# Patient Record
Sex: FEMALE | Race: BLACK OR AFRICAN AMERICAN | NOT HISPANIC OR LATINO | URBAN - METROPOLITAN AREA
[De-identification: names, ages, dates, MRNs, and addresses within clinical notes are randomized per-mention and may not be internally consistent; named-entity substitution may affect disease eponyms.]

---

## 2024-05-10 ENCOUNTER — OFFICE VISIT (OUTPATIENT)
Dept: FAMILY MEDICINE CLINIC | Facility: CLINIC | Age: 19
End: 2024-05-10
Payer: OTHER GOVERNMENT

## 2024-05-10 VITALS
OXYGEN SATURATION: 100 % | SYSTOLIC BLOOD PRESSURE: 90 MMHG | BODY MASS INDEX: 20.14 KG/M2 | HEIGHT: 69 IN | DIASTOLIC BLOOD PRESSURE: 68 MMHG | HEART RATE: 102 BPM | WEIGHT: 136 LBS | TEMPERATURE: 97 F
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
## 2024-05-10 DIAGNOSIS — G82.20 PARAPLEGIA: ICD-10-CM

## 2024-05-10 DIAGNOSIS — S06.9X2D TRAUMATIC BRAIN INJURY, WITH LOSS OF CONSCIOUSNESS OF 31 MINUTES TO 59 MINUTES, SUBSEQUENT ENCOUNTER: ICD-10-CM

## 2024-05-10 DIAGNOSIS — Z76.89 ENCOUNTER TO ESTABLISH CARE: Primary | ICD-10-CM

## 2024-05-10 DIAGNOSIS — S22.049K: ICD-10-CM

## 2024-05-10 DIAGNOSIS — S24.102D T5 SPINAL CORD INJURY, SUBSEQUENT ENCOUNTER: ICD-10-CM

## 2024-05-10 RX ORDER — GABAPENTIN 300 MG/1
CAPSULE ORAL
COMMUNITY
Start: 2024-05-02

## 2024-05-10 RX ORDER — LIDOCAINE 50 MG/G
2 PATCH TOPICAL EVERY 24 HOURS
Qty: 90 PATCH | Refills: 3 | Status: SHIPPED | OUTPATIENT
Start: 2024-05-10

## 2024-05-10 RX ORDER — MIDODRINE HYDROCHLORIDE 2.5 MG/1
TABLET ORAL
COMMUNITY
Start: 2024-05-02

## 2024-05-10 RX ORDER — LIDOCAINE 50 MG/G
PATCH TOPICAL
COMMUNITY
Start: 2024-05-08 | End: 2024-05-10 | Stop reason: SDUPTHER

## 2024-05-14 ENCOUNTER — TELEPHONE (OUTPATIENT)
Dept: FAMILY MEDICINE CLINIC | Facility: CLINIC | Age: 19
End: 2024-05-14
Payer: OTHER GOVERNMENT

## 2024-05-14 DIAGNOSIS — K59.00 CONSTIPATION, UNSPECIFIED CONSTIPATION TYPE: Primary | ICD-10-CM

## 2024-05-14 NOTE — TELEPHONE ENCOUNTER
Caller: Ehsan Woodson    Relationship: Self    Best call back number: [phone]    What medication are you requesting: bisacodyl suppository 10 MG    What are your current symptoms: PATIENT USES TO AID IN BOWEL MOVEMENT    How long have you been experiencing symptoms: PATIENT SAID SHE WAS PRESCRIBED PRESCRIPTION FROM  IN [place]    Have you had these symptoms before:    [] Yes  [] No    Have you been treated for these symptoms before:   [] Yes  [] No    If a prescription is needed, what is your preferred pharmacy and phone number: [facility][id number] - [place], [place] - [address] AT [facility] OF [address] & [place] - [phone]  - [phone] FX     Additional notes:PATIENT SAID SHE HAD ONE SUPPOSITORY LEFT AND SHE USES THIS TO AID WITH BOWEL MOVEMENTS

## 2024-05-15 RX ORDER — BISACODYL 10 MG
10 SUPPOSITORY, RECTAL RECTAL DAILY
Qty: 8 SUPPOSITORY | Refills: 0 | Status: SHIPPED | OUTPATIENT
Start: 2024-05-15 | End: 2024-06-14

## 2024-05-30 NOTE — TELEPHONE ENCOUNTER
Caller: Lily Woodsonmaddi    Relationship: Self    Best call back number: [phone]    Requested Prescriptions:   Requested Prescriptions     Pending Prescriptions Disp Refills    gabapentin (NEURONTIN) 300 MG capsule      midodrine (PROAMATINE) 2.5 MG tablet      CeleBREX 100 MG capsule      ALSO NEEDS TYLENOL 300 MG. AND MELATONIN     Pharmacy where request should be sent: [id number] - [place], [place] - [address] AT [facility] OF [facility] [address] & [place] - [phone] [facility] [phone] FX     Last office visit with prescribing clinician: 5/10/2024   Last telemedicine visit with prescribing clinician: Visit date not found   Next office visit with prescribing clinician: 6/10/2024     Additional details provided by patient:     Does the patient have less than a 3 day supply:  [x] Yes  [] No    Would you like a call back once the refill request has been completed: [] Yes [] No    If the office needs to give you a call back, can they leave a voicemail: [] Yes [] No    Jing Nam Rep   05/30/24 13:24 EDT

## 2024-05-31 ENCOUNTER — TREATMENT (OUTPATIENT)
Dept: PHYSICAL THERAPY | Facility: CLINIC | Age: 19
End: 2024-05-31
Payer: OTHER GOVERNMENT

## 2024-05-31 DIAGNOSIS — S06.9X2D TRAUMATIC BRAIN INJURY WITH LOSS OF CONSCIOUSNESS OF 31 MINUTES TO 59 MINUTES, SUBSEQUENT ENCOUNTER: ICD-10-CM

## 2024-05-31 DIAGNOSIS — S24.102D T5 SPINAL CORD INJURY, SUBSEQUENT ENCOUNTER: Primary | ICD-10-CM

## 2024-05-31 DIAGNOSIS — R26.2 DIFFICULTY WALKING: ICD-10-CM

## 2024-05-31 PROCEDURE — 97530 THERAPEUTIC ACTIVITIES: CPT | Performed by: PHYSICAL THERAPIST

## 2024-05-31 PROCEDURE — 97162 PT EVAL MOD COMPLEX 30 MIN: CPT | Performed by: PHYSICAL THERAPIST
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
## 2024-06-04 ENCOUNTER — TELEPHONE (OUTPATIENT)
Dept: FAMILY MEDICINE CLINIC | Facility: CLINIC | Age: 19
End: 2024-06-04
Payer: OTHER GOVERNMENT

## 2024-06-04 DIAGNOSIS — S22.049K: ICD-10-CM

## 2024-06-04 DIAGNOSIS — K59.00 CONSTIPATION, UNSPECIFIED CONSTIPATION TYPE: ICD-10-CM

## 2024-06-04 DIAGNOSIS — F41.9 ANXIETY: Primary | ICD-10-CM

## 2024-06-04 DIAGNOSIS — G47.00 INSOMNIA, UNSPECIFIED TYPE: ICD-10-CM

## 2024-06-04 RX ORDER — UREA 10 %
5 LOTION (ML) TOPICAL NIGHTLY
Qty: 90 EACH | Refills: 0 | Status: SHIPPED | OUTPATIENT
Start: 2024-06-04

## 2024-06-04 RX ORDER — GABAPENTIN 300 MG/1
300 CAPSULE ORAL 3 TIMES DAILY
Qty: 180 CAPSULE | Refills: 0 | Status: SHIPPED | OUTPATIENT
Start: 2024-06-04

## 2024-06-04 RX ORDER — UREA 10 %
5 LOTION (ML) TOPICAL
COMMUNITY
Start: 2024-05-01 | End: 2024-06-04 | Stop reason: SDUPTHER

## 2024-06-04 RX ORDER — MIDODRINE HYDROCHLORIDE 2.5 MG/1
TABLET ORAL
OUTPATIENT
Start: 2024-06-04

## 2024-06-04 RX ORDER — MIDODRINE HYDROCHLORIDE 2.5 MG/1
2.5 TABLET ORAL
Qty: 90 TABLET | Refills: 0 | Status: SHIPPED | OUTPATIENT
Start: 2024-06-04

## 2024-06-04 RX ORDER — GABAPENTIN 300 MG/1
CAPSULE ORAL
OUTPATIENT
Start: 2024-06-04

## 2024-06-04 NOTE — TELEPHONE ENCOUNTER
Caller: [id number] - [place], [place] - [address] AT [facility] OF [address] [facility] & [place] - [phone] [facility] [phone] FX    Relationship to patient: Pharmacy    Best call back number: [phone]    Patient is needing: PHARMACY RECEIVED A PRESCRIPTION FOR CELEBREX AND IT HAD TO BE NAME BRAND. THE INSURANCE WILL ONLY COVER THE GENERIC AND PATIENT CANNOT AFFORD THE NAME BRAND OUT OF POCKET EXPENSE. PLEASE CALL PHARMACY AND CLARIFY IF THIS CAN BE GENERIC.

## 2024-06-04 NOTE — TELEPHONE ENCOUNTER
Can you please find out that the patient is coming in to see me soon also the medications placed 6 are without any sig

## 2024-06-04 NOTE — TELEPHONE ENCOUNTER
Hub staff attempted to follow warm transfer process and was unsuccessful     Caller: Ehsan Woodson    Relationship to patient: Self    Best call back number: [phone]    Patient is needing:          THE PATIENT RETURNED A CALL. SHE SAID SHE RECEIVED A CALL  3 TIMES. SHE THINKS IT MAY BE ABOUT HER MEDICATION

## 2024-06-05 ENCOUNTER — TELEPHONE (OUTPATIENT)
Dept: FAMILY MEDICINE CLINIC | Facility: CLINIC | Age: 19
End: 2024-06-05
Payer: OTHER GOVERNMENT

## 2024-06-10 ENCOUNTER — OFFICE VISIT (OUTPATIENT)
Dept: FAMILY MEDICINE CLINIC | Facility: CLINIC | Age: 19
End: 2024-06-10
Payer: OTHER GOVERNMENT

## 2024-06-10 VITALS
RESPIRATION RATE: 18 BRPM | DIASTOLIC BLOOD PRESSURE: 64 MMHG | OXYGEN SATURATION: 99 % | SYSTOLIC BLOOD PRESSURE: 118 MMHG | WEIGHT: 136 LBS | HEIGHT: 69 IN | TEMPERATURE: 97.9 F | HEART RATE: 81 BPM | BODY MASS INDEX: 20.14 KG/M2

## 2024-06-10 DIAGNOSIS — K59.00 CONSTIPATION, UNSPECIFIED CONSTIPATION TYPE: ICD-10-CM

## 2024-06-10 DIAGNOSIS — M62.89 MUSCLE STIFFNESS: Primary | ICD-10-CM

## 2024-06-10 DIAGNOSIS — S22.049K: ICD-10-CM

## 2024-06-10 DIAGNOSIS — F33.0 MDD (MAJOR DEPRESSIVE DISORDER), RECURRENT EPISODE, MILD: ICD-10-CM

## 2024-06-10 DIAGNOSIS — S24.102D T5 SPINAL CORD INJURY, SUBSEQUENT ENCOUNTER: ICD-10-CM

## 2024-06-10 PROCEDURE — 99214 OFFICE O/P EST MOD 30 MIN: CPT | Performed by: STUDENT IN AN ORGANIZED HEALTH CARE EDUCATION/TRAINING PROGRAM

## 2024-06-10 RX ORDER — BISACODYL 10 MG
10 SUPPOSITORY, RECTAL RECTAL DAILY
Qty: 30 SUPPOSITORY | Refills: 12 | Status: SHIPPED | OUTPATIENT
Start: 2024-06-10 | End: 2024-07-10

## 2024-06-10 RX ORDER — CYCLOBENZAPRINE HCL 5 MG
5 TABLET ORAL NIGHTLY PRN
Qty: 30 TABLET | Refills: 0 | Status: SHIPPED | OUTPATIENT
Start: 2024-06-10
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
## 2024-06-11 ENCOUNTER — TREATMENT (OUTPATIENT)
Dept: PHYSICAL THERAPY | Facility: CLINIC | Age: 19
End: 2024-06-11
Payer: OTHER GOVERNMENT

## 2024-06-11 DIAGNOSIS — S06.9X2D TRAUMATIC BRAIN INJURY WITH LOSS OF CONSCIOUSNESS OF 31 MINUTES TO 59 MINUTES, SUBSEQUENT ENCOUNTER: ICD-10-CM

## 2024-06-11 DIAGNOSIS — R26.2 DIFFICULTY WALKING: ICD-10-CM

## 2024-06-11 DIAGNOSIS — S24.102D T5 SPINAL CORD INJURY, SUBSEQUENT ENCOUNTER: Primary | ICD-10-CM
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
## 2024-06-12 ENCOUNTER — TREATMENT (OUTPATIENT)
Dept: PHYSICAL THERAPY | Facility: CLINIC | Age: 19
End: 2024-06-12
Payer: OTHER GOVERNMENT

## 2024-06-12 DIAGNOSIS — S24.102D T5 SPINAL CORD INJURY, SUBSEQUENT ENCOUNTER: Primary | ICD-10-CM

## 2024-06-12 DIAGNOSIS — S06.9X2D TRAUMATIC BRAIN INJURY WITH LOSS OF CONSCIOUSNESS OF 31 MINUTES TO 59 MINUTES, SUBSEQUENT ENCOUNTER: ICD-10-CM

## 2024-06-12 DIAGNOSIS — R26.2 DIFFICULTY WALKING: ICD-10-CM

## 2024-06-12 NOTE — PROGRESS NOTES
Physical Therapy Daily Treatment Note  [facility] [address] [place] [address]    Patient: Ehsan Woodson   : 2005  Referring practitioner: Gary Cash MD  Date of Initial Visit: Type: THERAPY  Noted: 2024  Today's Date: 2024  Patient seen for 3 sessions           Subjective  Ehsan Woodson reports:  she felt tired after her therapy session yesterday.     Objective   Ehsan brought to therapy by her mother Charmaine who remained in attendance.   STS in //bars with SBA, heavy use of arms.    See Exercise, Manual, and Modality Logs for complete treatment.     Assessment/Plan  Ehsan is so willing to try new things in treatment.  Charmaine, pts mother, took photos of new exercises for them to work on at home. She is to practice visualization with all. Continue per POC.    Visit Diagnoses:    ICD-10-CM ICD-9-CM   1. T5 spinal cord injury, subsequent encounter  S24.102D V58.89     952.10   2. 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 Difficulty walking  R26.2 719.7       Progress per Plan of Care and Progress strengthening /stabilization /functional activity         Timed:  Manual Therapy:         mins  79986;  Therapeutic Exercise:    10     mins  47127;     Neuromuscular Rachana:    20    mins  89857;    Therapeutic Activity:     15     mins  39932;     Gait Training:           mins  88418;     Ultrasound:          mins  88833;    Electrical Stimulation:         mins  00536 ( );  Aquatics  __   mins   57165    Untimed:  Electrical Stimulation:         mins  79584 ( );  Mechanical Traction:         mins  61704;     Timed Treatment:   45   mins   Total Treatment:     45   mins    Electronically Signed:  Pauline Johnson PTA  Physical Therapist Assistant    [place] PTA license [id number]

## 2024-06-17 ENCOUNTER — TELEPHONE (OUTPATIENT)
Dept: FAMILY MEDICINE CLINIC | Facility: CLINIC | Age: 19
End: 2024-06-17
Payer: OTHER GOVERNMENT

## 2024-06-17 NOTE — TELEPHONE ENCOUNTER
Efren , As you know I’m having spasms and at first they weren’t so strong but now they are very strong in my legs and I can’t control it. My PT therapists were telling me about her spasms and how she took a medication to help them. Is there anything you can prescribe to help stop my spams so they won’t be so strong?  Thank you      -radha

## 2024-06-17 NOTE — TELEPHONE ENCOUNTER
----- Message from [facility] [employer] sent at 6/17/2024  1:37 PM EDT -----  Regarding: Spasms  Contact: [phone]  Okay thank you . If there’s anything you can do , send to another doctor or anything please let me know . Thank you again

## 2024-06-17 NOTE — TELEPHONE ENCOUNTER
----- Message from [facility] [employer] sent at 6/17/2024  1:37 PM EDT -----  Regarding: Spasms  Contact: [phone]  Okay thank you . If there’s anything you can do , send to another doctor or anything please let me know . Thank you again

## 2024-06-18 ENCOUNTER — TREATMENT (OUTPATIENT)
Dept: PHYSICAL THERAPY | Facility: CLINIC | Age: 19
End: 2024-06-18
Payer: OTHER GOVERNMENT

## 2024-06-18 DIAGNOSIS — S24.102D T5 SPINAL CORD INJURY, SUBSEQUENT ENCOUNTER: Primary | ICD-10-CM

## 2024-06-18 DIAGNOSIS — S06.9X2D TRAUMATIC BRAIN INJURY WITH LOSS OF CONSCIOUSNESS OF 31 MINUTES TO 59 MINUTES, SUBSEQUENT ENCOUNTER: ICD-10-CM

## 2024-06-18 DIAGNOSIS — R26.2 DIFFICULTY WALKING: ICD-10-CM

## 2024-06-18 NOTE — PROGRESS NOTES
Physical Therapy Daily Treatment Note  [place] [facility] [address] [place] [address]    Patient: Ehsan Woodson   : 2005  Referring practitioner: Gary Cash MD  Date of Initial Visit: Type: THERAPY  Noted: 2024  Today's Date: 2024  Patient seen for 4 sessions           Subjective  Ehsan Woodson reports: she has been working on exercises in bed. Ehsan related she doesn't like to lie on her stomach but has been doing this also.      Objective   Ehsan attended session with her mother Gilbert who remained in attendance. She videoed Ehsan walking in //bars with solo step.    See Exercise, Manual, and Modality Logs for complete treatment.       Assessment/Plan  Gains made in her standing/gait today. Ehsan did better with all her transitions,STS, sit to supine/to long sit. She feels the muscle relaxers are helping reduce her spacticity.     Visit Diagnoses:    ICD-10-CM ICD-9-CM   1. T5 spinal cord injury, subsequent encounter  S24.102D V58.89     952.10   2. 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 Difficulty walking  R26.2 719.7       Progress per Plan of Care and Progress strengthening /stabilization /functional activity           Timed:  Manual Therapy:    8     mins  18714;  Therapeutic Exercise:         mins  92238;     Neuromuscular Rachnaa:    20    mins  99556;    Therapeutic Activity:     15     mins  39106;     Gait Training:           mins  34063;     Ultrasound:          mins  83620;    Electrical Stimulation:         mins  23481 ( );  Aquatics  __   mins   68895    Untimed:  Electrical Stimulation:         mins  33143 ( );  Mechanical Traction:         mins  10934;     Timed Treatment:   43   mins   Total Treatment:     43   mins    Electronically Signed:  Pauline Johnson PTA  Physical Therapist Assistant    [place] PTA license [id number]

## 2024-06-19 ENCOUNTER — TREATMENT (OUTPATIENT)
Dept: PHYSICAL THERAPY | Facility: CLINIC | Age: 19
End: 2024-06-19
Payer: OTHER GOVERNMENT

## 2024-06-19 DIAGNOSIS — S06.9X2D TRAUMATIC BRAIN INJURY WITH LOSS OF CONSCIOUSNESS OF 31 MINUTES TO 59 MINUTES, SUBSEQUENT ENCOUNTER: ICD-10-CM

## 2024-06-19 DIAGNOSIS — S24.102D T5 SPINAL CORD INJURY, SUBSEQUENT ENCOUNTER: Primary | ICD-10-CM

## 2024-06-19 DIAGNOSIS — R26.2 DIFFICULTY WALKING: ICD-10-CM

## 2024-06-19 NOTE — PROGRESS NOTES
Physical Therapy Daily Treatment Note  [place] [facility] [address] [place] [address] [place] [address]    Patient: Ehsan Woodson   : 2005  Referring practitioner: Gary Cash MD  Date of Initial Visit: Type: THERAPY  Noted: 2024  Today's Date: 2024  Patient seen for 5 sessions           Subjective  Ehsan Woodson reports: she went home and ate then rested yesterday after P.T. When asked about the video her mother took of her walking, both Ehsan and her mother Dante both spoke about sending it to family and the positive feedback they received. Ehsan spoke about her spasms being less.    Objective   See Exercise, Manual, and Modality Logs for complete treatment.     Assessment/Plan  Each session is revealing improved active/functional ability. Ehsan and her mother report she was not taking steps while in rehab in [place] and that yesterday was her first time being able to take steps. Retro gait required great effort yet Ehsan demonstrated ability to take 3 steps retro B feet, twice. Ongoing need for therapist directed intervention to further her recovery.    Visit Diagnoses:    ICD-10-CM ICD-9-CM   1. T5 spinal cord injury, subsequent encounter  S24.102D V58.89     952.10   2. 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 Difficulty walking  R26.2 719.7       Progress per Plan of Care and Progress strengthening /stabilization /functional activity           Timed:  Manual Therapy:         mins  44648;  Therapeutic Exercise:         mins  84529;     Neuromuscular Rachana:    15    mins  50421;    Therapeutic Activity:     15     mins  05269;     Gait Training:      15     mins  67178;     Ultrasound:          mins  44965;    Electrical Stimulation:         mins  99036 ( );  Aquatics  __   mins   41685    Untimed:  Electrical Stimulation:         mins  17062 ( );  Mechanical Traction:         mins  72347;      Timed Treatment:   45   mins   Total Treatment:     45   mins    Electronically Signed:  Pauline Johnson PTA  Physical Therapist Assistant    [place] [facility] license [id number]

## 2024-06-20 ENCOUNTER — TREATMENT (OUTPATIENT)
Dept: PHYSICAL THERAPY | Facility: CLINIC | Age: 19
End: 2024-06-20
Payer: OTHER GOVERNMENT

## 2024-06-20 DIAGNOSIS — R26.2 DIFFICULTY WALKING: ICD-10-CM

## 2024-06-20 DIAGNOSIS — S06.9X2D TRAUMATIC BRAIN INJURY WITH LOSS OF CONSCIOUSNESS OF 31 MINUTES TO 59 MINUTES, SUBSEQUENT ENCOUNTER: ICD-10-CM

## 2024-06-20 DIAGNOSIS — S24.102D T5 SPINAL CORD INJURY, SUBSEQUENT ENCOUNTER: Primary | ICD-10-CM

## 2024-06-20 NOTE — PROGRESS NOTES
Outpatient Physical Therapy  [address] [facility], [place] [address]                            Physical Therapy Daily Treatment Note    Patient: Ehsan Woodson   : 2005  Diagnosis/ICD-10 Code:  T5 spinal cord injury, subsequent encounter [S24.102D]  Referring practitioner: Gary Cash MD  Date of Initial Visit: Type: THERAPY  Noted: 2024  Today's Date: 2024  Patient seen for 6 sessions           Subjective   Ehsan Woodson reports: that she doesn't necessarily get sore after PT sessions. States that her right leg feels stronger than her left leg. No complaints of back pain from sitting in wheelchair, she does get up and walk.     Objective   Increased Clonus in LLE with continued exercise.     See Exercise, Manual, and Modality Logs for complete treatment.     Assessment/Plan  Ehsan progressing as evident by decreased falls and increased tolerance of PT session. Pt required  seated rest breaks following ambulating in parallel bars  . Pt would benefit from skilled PT to address strength and endurance deficits, transfer and gait training and any concerns with ADLs.   .    Progress per Plan of Care         Timed:  Manual Therapy:        mins  10577;  Therapeutic Exercise:    15     mins  20498;     Neuromuscular Rachana:    15    mins  17092;    Therapeutic Activity:     15     mins  77668;     Gait Training:           mins  23616;          Timed Treatment:   45   mins   Total Treatment:     45   mins      Electronically signed:   Machelle Kevin PTA  Physical Therapist Assistant  [facility] License #: [id number]

## 2024-06-26 ENCOUNTER — TREATMENT (OUTPATIENT)
Dept: PHYSICAL THERAPY | Facility: CLINIC | Age: 19
End: 2024-06-26
Payer: OTHER GOVERNMENT

## 2024-06-26 DIAGNOSIS — S06.9X2D TRAUMATIC BRAIN INJURY WITH LOSS OF CONSCIOUSNESS OF 31 MINUTES TO 59 MINUTES, SUBSEQUENT ENCOUNTER: ICD-10-CM

## 2024-06-26 DIAGNOSIS — R26.2 DIFFICULTY WALKING: ICD-10-CM

## 2024-06-26 DIAGNOSIS — S24.102D T5 SPINAL CORD INJURY, SUBSEQUENT ENCOUNTER: Primary | ICD-10-CM

## 2024-06-26 NOTE — PROGRESS NOTES
Physical Therapy Daily Treatment Note                      [place] [facility] [address] [place] [address]    Patient: Ehsan Woodson   : 2005  Diagnosis/ICD-10 Code:  T5 spinal cord injury, subsequent encounter [S24.102D]  Referring practitioner: Gary Cash MD  Date of Initial Visit: Type: THERAPY  Noted: 2024  Today's Date: 2024  Patient seen for 7 sessions           Subjective   The patient reported that she was breaking a sweat during the session today. She reported that she liked today's session because she did a lot of challenging things that she has not done before.    Objective   See Exercise, Manual, and Modality Logs for complete treatment.     Assessment/Plan  Pt tolerated today's session well. Pt continues to show gradual improvement. She seems like she is able to tolerate each session a little better than the last, which indicates improvements in global endurance and core/LE strength. Pt was able to walk in the harness on the solo step today with a walker, which she has never done before. She still requires min-mod verbal cueing to straighten the stance leg and activate the stance side glute. During supine assisted HS curls today, pt did have significant increased L quadriceps tone that made activating the L hamstring difficult. PT still wondering if baclofen would be beneficial once pt has an established neurologist in this area. Pt still unable to transfer from the floor to her wheelchair due to core weakness and lack of LE strength/coordination. Skilled therapy still required in order to continue addressing impairments so that pt can have increased independence and continue working towards her goal of unassisted ambulation. Continue POC.       Timed:  Manual Therapy:    0     mins  03785;  Therapeutic Exercise:    0     mins  85193;     Neuromuscular Rachana:  12    mins  07055;    Therapeutic Activity:     23     mins  39224;     Gait Trainin     mins  12884;      Aquatics                         0      mins  91488    Un-timed:  Mechanical Traction      0     mins  31311  Dry Needling     0     mins self-pay  Electrical Stimulation:    0     mins  34196 ( );      Timed Treatment:   55   mins   Total Treatment:     55   mins    Mana Awad PT    Electronically signed 6/26/2024    [place] License: PT - [id number]

## 2024-06-27 ENCOUNTER — TREATMENT (OUTPATIENT)
Dept: PHYSICAL THERAPY | Facility: CLINIC | Age: 19
End: 2024-06-27
Payer: OTHER GOVERNMENT

## 2024-06-27 ENCOUNTER — TELEPHONE (OUTPATIENT)
Dept: FAMILY MEDICINE CLINIC | Facility: CLINIC | Age: 19
End: 2024-06-27

## 2024-06-27 DIAGNOSIS — F33.0 MDD (MAJOR DEPRESSIVE DISORDER), RECURRENT EPISODE, MILD: Primary | ICD-10-CM

## 2024-06-27 DIAGNOSIS — R26.2 DIFFICULTY WALKING: ICD-10-CM

## 2024-06-27 DIAGNOSIS — S24.102D T5 SPINAL CORD INJURY, SUBSEQUENT ENCOUNTER: Primary | ICD-10-CM

## 2024-06-27 DIAGNOSIS — F41.9 ANXIETY: ICD-10-CM

## 2024-06-27 DIAGNOSIS — S06.9X2D TRAUMATIC BRAIN INJURY WITH LOSS OF CONSCIOUSNESS OF 31 MINUTES TO 59 MINUTES, SUBSEQUENT ENCOUNTER: ICD-10-CM

## 2024-06-27 NOTE — PROGRESS NOTES
Physical Therapy Daily Treatment Note  [place] [facility] [address] [place] [address]    Patient: Ehsan Woodson   : 2005  Referring practitioner: Gary Cash MD  Date of Initial Visit: Type: THERAPY  Noted: 2024  Today's Date: 2024  Patient seen for 8 sessions           Subjective  Ehsan Woodson reports:  she has been working out at home. Charmaine, pts mother, presented a video of her walking at home using her RW.    Objective   See Exercise, Manual, and Modality Logs for complete treatment.     Assessment/Plan  Ehsan performs transfers between W/C and equipment with SBA to min A. She seemed excited to be on resistive equipment. Igor returns tomorrow for ongoing therapist directed intervention to promote strength, balance and function.    Visit Diagnoses:    ICD-10-CM ICD-9-CM   1. T5 spinal cord injury, subsequent encounter  S24.102D V58.89     952.10   2. 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 Difficulty walking  R26.2 719.7       Progress per Plan of Care and Progress strengthening /stabilization /functional activity         Timed:  Manual Therapy:         mins  50920;  Therapeutic Exercise:    10     mins  51106;     Neuromuscular Rachana:    20    mins  24869;    Therapeutic Activity:     14     mins  31151;     Gait Training:           mins  96716;     Ultrasound:          mins  24638;    Electrical Stimulation:         mins  10199 ( );  Aquatics  __   mins   62673    Untimed:  Electrical Stimulation:         mins  22864 ( );  Mechanical Traction:         mins  62937;     Timed Treatment:   44   mins   Total Treatment:     44   mins    Electronically Signed:  Pauline Johnson PTA  Physical Therapist Assistant    [place] PTA license [id number]

## 2024-06-28 ENCOUNTER — TREATMENT (OUTPATIENT)
Dept: PHYSICAL THERAPY | Facility: CLINIC | Age: 19
End: 2024-06-28
Payer: OTHER GOVERNMENT

## 2024-06-28 DIAGNOSIS — S06.9X2D TRAUMATIC BRAIN INJURY WITH LOSS OF CONSCIOUSNESS OF 31 MINUTES TO 59 MINUTES, SUBSEQUENT ENCOUNTER: ICD-10-CM

## 2024-06-28 DIAGNOSIS — S24.102D T5 SPINAL CORD INJURY, SUBSEQUENT ENCOUNTER: Primary | ICD-10-CM

## 2024-06-28 DIAGNOSIS — R26.2 DIFFICULTY WALKING: ICD-10-CM

## 2024-06-28 NOTE — PROGRESS NOTES
Progress Note  [place] [facility] [address] [place] [address]      Patient: Ehsan Woodson   : 2005  Diagnosis/ICD-10 Code:  T5 spinal cord injury, subsequent encounter [S24.102D]  Referring practitioner: Gary Cash MD  Date of Initial Visit: Type: THERAPY  Noted: 2024  Today's Date: 2024  Patient seen for 9 sessions      Subjective:   Subjective Questionnaire: OPTIMAL: 3 activities you would like to be able to do w/o any difficulty (10,11,16). Total score: 17 (total score of all activities: 82)  Primary goal: 11  Clinical Progress: improved  Home Program Compliance: Yes  Treatment has included: therapeutic exercise, neuromuscular re-education, manual therapy, therapeutic activity, gait training, and electrical stimulation    Subjective     Pt reports she has been standing and walking at home with her walker.     Objective        Left Hip   Planes of Motion   Flexion: 3+     Right Hip   Planes of Motion   Flexion: 4+     Left Knee   Flexion: 3-  Extension: 5     Right Knee   Flexion: 4+  Extension: 4-     Left Ankle/Foot   Dorsiflexion: 0     Right Ankle/Foot   Dorsiflexion: 4-    Testing:   Standing with RW (mostly not touching walker): 2 minutes CGA/Sheron    Amb 37.6 feet with RW CGA x 2       See Exercise, Manual, and Modality Logs for complete treatment.     Assessment/Plan    Pt tolerated today's session well. Today was reassessment day. Pt attempted to perform an indep sit to stand transfer from the treatment table (without her RW to pull up on). She was able to independently get to standing, but once standing, she fell into the therapist towards the left, and the therapist had to assist her to sit back down. Pt has improved greatly with her gait since the initial evaluation. Pt is now able to ambulate with a rolling walker without being in the solo step harness. Pt still has increased quad tone on the left, which makes advancement of the L LE during swing phase difficult. Pt also  still gets fatigued very quickly. Pt still experiencing increased clonus in the L LE (and sometimes the R) when she gets fatigued. Pt was able to try the recumbent bike for the first time today and did very well with the therapist providing min tactile assist at the L hamstrings. Pt requires continued skilled PT services in order to address deficits limiting independence during ADLs. Continue POC.   Goals  Plan Goals: 1. Mobility: Walking/Moving Around Functional Limitation                      LTG 1: 20 weeks: The patient will be able to ambulate 100 ft with rolling walker with min A in order to improve function and independence with ADLs.  STATUS: progressing   STG 1: 12 weeks: The patient will be able to ambulate 15 ft with rolling walker with min A in order to improve function and independence with ADLs.  STATUS: MET     2. Mobility: Walking/Moving Around Functional Limitation                      LTG 2: 12 weeks: The patient will be able to stand independently for 4 minutes with rolling walker to improve function during ADLs such as cooking, grooming her hair and other activities.  STATUS: progressing   STG 2: 6 weeks: The patient will be able to stand with CGA with rolling walker for 2 minutes to improve function during ADLs such as cooking, grooming her hair and other activities.  STATUS: MET     3. Mobility: Walking/Moving Around Functional Limitation                      LTG 2: 12 weeks: The patient will be able to complete sit to/from stand transfers independently.  STATUS: progressing  STG 2: 6 weeks: The patient will be able to complete sit to/from stand transfers with CGA.  STATUS: progressing     4. Mobility: Walking/Moving Around Functional Limitation                   LTG 4: 20 weeks: The patient will demonstrate improved function by scoring a 55 on the Optimal.  STATUS: progressing  STG 4: 6 weeks: The patient will demonstrate improved function by scoring a 70 on the Optimal.  STATUS: progressing         Plan  Therapy options: will be seen for skilled therapy services  Planned modality interventions: TENS, cryotherapy, thermotherapy (hydrocollator packs) and electrical stimulation/Russian stimulation  Planned therapy interventions: abdominal trunk stabilization, manual therapy, ADL retraining, motor coordination training, balance/weight-bearing training, neuromuscular re-education, body mechanics training, orthotic fitting/training, postural training, fine motor coordination training, soft tissue mobilization, spinal/joint mobilization, flexibility, functional ROM exercises, strengthening, stretching, gait training, home exercise program, therapeutic activities, IADL retraining, transfer training, joint mobilization and wheelchair management/propulsion training  Frequency: 2x week  Duration in weeks: 20  Treatment plan discussed with: patient and family        Progress toward previous goals: Partially Met      Recommendations: Continue as planned  Timeframe:  6 months  Prognosis to achieve goals: good    Signature: Mana Awad PT    Electronically signed 2024    [place] License: PT - [id number]      Timed:  Manual Therapy:    0     mins  50311;  Therapeutic Exercise:    0     mins  16755;     Neuromuscular Rachana:   0     mins  65147;    Therapeutic Activity:     32     mins  10366;     Gait Trainin   mins  59848;     Aquatics                         0      mins  81876    Un-timed:  Mechanical Traction      0     mins  48418  Dry Needling     0     mins self-pay  Electrical Stimulation:    0     mins  10719 ( );    Timed Treatment:   40   mins   Total Treatment:     40   mins

## 2024-07-01 ENCOUNTER — TREATMENT (OUTPATIENT)
Dept: PHYSICAL THERAPY | Facility: CLINIC | Age: 19
End: 2024-07-01
Payer: OTHER GOVERNMENT

## 2024-07-01 DIAGNOSIS — R26.2 DIFFICULTY WALKING: ICD-10-CM

## 2024-07-01 DIAGNOSIS — S24.102D T5 SPINAL CORD INJURY, SUBSEQUENT ENCOUNTER: Primary | ICD-10-CM

## 2024-07-01 DIAGNOSIS — S06.9X2D TRAUMATIC BRAIN INJURY WITH LOSS OF CONSCIOUSNESS OF 31 MINUTES TO 59 MINUTES, SUBSEQUENT ENCOUNTER: ICD-10-CM

## 2024-07-01 NOTE — PROGRESS NOTES
"Physical Therapy Daily Treatment Note  [place] [facility] [address] [place] [address]    Patient: Ehsan Woodson   : 2005  Referring practitioner: Gary Cash MD  Date of Initial Visit: Type: THERAPY  Noted: 2024  Today's Date: 2024  Patient seen for 10 sessions           Subjective  Ehsan Woodson reports: she is ready to work. Today she commented on the clinic being hot, \"not like I am working\" she stated as she laughed.      Objective   Ehsna accompanied by both her mother and father today. Both gave good encouragement as she performed program.     See Exercise, Manual, and Modality Logs for complete treatment.     Assessment/Plan  Increased tone L remains with observed spasming. B clonus at times. Ehsan did stand at times with no UE A. Ehsan is making great progress with functional abilities. She requires ongoing therapist directed intervention as outlined in PN 6-28-24.    Visit Diagnoses:    ICD-10-CM ICD-9-CM   1. T5 spinal cord injury, subsequent encounter  S24.102D V58.89     952.10   2. 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 Difficulty walking  R26.2 719.7       Progress per Plan of Care and Progress strengthening /stabilization /functional activity           Timed:  Manual Therapy:         mins  38369;  Therapeutic Exercise:         mins  11094;     Neuromuscular Rachana:    15    mins  54105;    Therapeutic Activity:     10     mins  76635;     Gait Trainin     mins  24732;     Ultrasound:          mins  27589;    Electrical Stimulation:         mins  73363 ( );  Aquatics  __   mins   94211    Untimed:  Electrical Stimulation:         mins  81691 ( );  Mechanical Traction:         mins  02147;     Timed Treatment:   45   mins   Total Treatment:     45   mins    Electronically Signed:  Pauline Johnson PTA  Physical Therapist Assistant    [place] [facility] license " [id number]

## 2024-07-02 DIAGNOSIS — S22.049K: ICD-10-CM

## 2024-07-02 DIAGNOSIS — M62.89 MUSCLE STIFFNESS: ICD-10-CM

## 2024-07-03 ENCOUNTER — TREATMENT (OUTPATIENT)
Dept: PHYSICAL THERAPY | Facility: CLINIC | Age: 19
End: 2024-07-03
Payer: OTHER GOVERNMENT

## 2024-07-03 DIAGNOSIS — S06.9X2D TRAUMATIC BRAIN INJURY WITH LOSS OF CONSCIOUSNESS OF 31 MINUTES TO 59 MINUTES, SUBSEQUENT ENCOUNTER: ICD-10-CM

## 2024-07-03 DIAGNOSIS — S24.102D T5 SPINAL CORD INJURY, SUBSEQUENT ENCOUNTER: Primary | ICD-10-CM

## 2024-07-03 DIAGNOSIS — R26.2 DIFFICULTY WALKING: ICD-10-CM

## 2024-07-03 RX ORDER — CYCLOBENZAPRINE HCL 5 MG
5 TABLET ORAL NIGHTLY PRN
Qty: 30 TABLET | Refills: 0 | Status: SHIPPED | OUTPATIENT
Start: 2024-07-03

## 2024-07-03 NOTE — PROGRESS NOTES
Outpatient Physical Therapy  [address] [facility], [place] [address]                            Physical Therapy Daily Treatment Note    Patient: Ehsan Woodson   : 2005  Diagnosis/ICD-10 Code:  T5 spinal cord injury, subsequent encounter [S24.102D]  Referring practitioner: Gary Cash MD  Date of Initial Visit: Type: THERAPY  Noted: 2024  Today's Date: 7/3/2024  Patient seen for 11 sessions           Subjective   Ehsan Woodson reports: that she wasn't overly tired after last PT session, although Dad says she was very tired.    Objective   Increased clonus in BLE with fatigue.      See Exercise, Manual, and Modality Logs for complete treatment.     Assessment/Plan  Ehsan progressing as evident by decreased falls and increased tolerance of PT session. Pt required moderate assistance throughout PT session, especially with exercises sitting on ball. Pt would benefit from skilled PT to address strength and endurance deficits, transfer and gait training and any concerns with ADLs.       Progress per Plan of Care         Timed:  Manual Therapy:        mins  80169;  Therapeutic Exercise:    15     mins  77237;     Neuromuscular Rachana:        mins  49140;    Therapeutic Activity:     25     mins  01149;     Gait Training:           mins  66464;          Timed Treatment:   40   mins   Total Treatment:     40   mins      Electronically signed:   Machelle Kevin PTA  Physical Therapist Assistant  [facility] License #: [id number]

## 2024-07-09 ENCOUNTER — TREATMENT (OUTPATIENT)
Dept: PHYSICAL THERAPY | Facility: CLINIC | Age: 19
End: 2024-07-09
Payer: OTHER GOVERNMENT

## 2024-07-09 DIAGNOSIS — S24.102D T5 SPINAL CORD INJURY, SUBSEQUENT ENCOUNTER: Primary | ICD-10-CM

## 2024-07-09 DIAGNOSIS — S06.9X2D TRAUMATIC BRAIN INJURY WITH LOSS OF CONSCIOUSNESS OF 31 MINUTES TO 59 MINUTES, SUBSEQUENT ENCOUNTER: ICD-10-CM

## 2024-07-09 DIAGNOSIS — R26.2 DIFFICULTY WALKING: ICD-10-CM

## 2024-07-09 NOTE — PROGRESS NOTES
Physical Therapy Daily Treatment Note                      [place] [facility] [address] [place] [address]    Patient: Ehsan Woodson   : 2005  Diagnosis/ICD-10 Code:  T5 spinal cord injury, subsequent encounter [S24.102D]  Referring practitioner: Gary Cash MD  Date of Initial Visit: Type: THERAPY  Noted: 2024  Today's Date: 2024  Patient seen for 12 sessions           Subjective   The patient reported that her back gets achey when she works out. Pt continues to work out at home.    Objective     Pt amb 71 consecutive feet today with the RW CGA    Pt ambulated 140' with RW CGA with one sitting rest break.    See Exercise, Manual, and Modality Logs for complete treatment.     Assessment/Plan  Pt tolerated today's session well. Pt continues to show good improvement. Pt was able to ambulate a total of 140' with her RW today, which is more than she has ever ambulated before in the clinic without the solo step harness. This shows great improvements in gait and LE endurance. Pt is also improving with her STS (with RW) from her wheelchair. Pt was able to perform step-ups for the first time in-clinic today. However, pt had difficulty stepping up and down with L leg due to clearance issues caused by increased quadriceps tone. PT had to provide mod tactile assist to help with L leg step up and step down. Skilled therapy still required in order to continue addressing impairments so that pt can have increased independence and continue working towards her goal of unassisted ambulation. Continue POC.        Timed:  Manual Therapy:    0     mins  72728;  Therapeutic Exercise:    15     mins  60760;     Neuromuscular Rachana:   0    mins  98960;    Therapeutic Activity:     29     mins  33140;     Gait Training:      10     mins  10827;     Aquatics                         0      mins  46378    Un-timed:  Mechanical Traction      0     mins  11039  Dry Needling     0     mins self-pay  Electrical  Stimulation:    0     mins  76440 ( );      Timed Treatment:   54   mins   Total Treatment:     54   mins    Mana Awad PT    Electronically signed 7/9/2024    [place] License: PT - [id number]

## 2024-07-10 ENCOUNTER — TREATMENT (OUTPATIENT)
Dept: PHYSICAL THERAPY | Facility: CLINIC | Age: 19
End: 2024-07-10
Payer: OTHER GOVERNMENT

## 2024-07-10 DIAGNOSIS — R26.2 DIFFICULTY WALKING: ICD-10-CM

## 2024-07-10 DIAGNOSIS — S06.9X2D TRAUMATIC BRAIN INJURY WITH LOSS OF CONSCIOUSNESS OF 31 MINUTES TO 59 MINUTES, SUBSEQUENT ENCOUNTER: ICD-10-CM

## 2024-07-10 DIAGNOSIS — S24.102D T5 SPINAL CORD INJURY, SUBSEQUENT ENCOUNTER: Primary | ICD-10-CM

## 2024-07-10 NOTE — PROGRESS NOTES
Physical Therapy Daily Treatment Note                      [place] [facility] [address] [facility] [place] [address]    Patient: Ehsan Woodson   : 2005  Diagnosis/ICD-10 Code:  T5 spinal cord injury, subsequent encounter [S24.102D]  Referring practitioner: Gary Cash MD  Date of Initial Visit: Type: THERAPY  Noted: 2024  Today's Date: 7/10/2024  Patient seen for 13 sessions           Subjective   The patient reported that she felt pretty good after her last treatment session.     Objective   See Exercise, Manual, and Modality Logs for complete treatment.     Assessment/Plan  Pt tolerated today's session well. Pt continues to show good improvement. Pt was able to ambulate a total of 140' with her RW without resting today, which is an improvement even compared to yesterday's session. This shows improved Le endurance. Pt is improving with her efficiency during step ups, as well, although she still required mod tactile assist at the L hamstrings to help with L leg step up and step down. Pt able to perform mini jumps on the total gym today at a decreased angle with PT helping placement of L foot upon landing. Skilled therapy still required in order to continue addressing impairments so that pt can have increased independence and continue working towards her goal of unassisted ambulation. Continue POC.        Timed:  Manual Therapy:    0     mins  45774;  Therapeutic Exercise:    20     mins  76866;     Neuromuscular Rachana:   0   mins  68310;    Therapeutic Activity:     23     mins  12784;     Gait Training:      10     mins  90338;     Aquatics                         0      mins  36770    Un-timed:  Mechanical Traction      0     mins  91087  Dry Needling     0     mins self-pay  Electrical Stimulation:    0     mins  73276 ( );      Timed Treatment:   53   mins   Total Treatment:     53   mins    Mana Awad PT    Electronically signed 7/10/2024    [place] License: PT - [id number]

## 2024-07-12 ENCOUNTER — TREATMENT (OUTPATIENT)
Dept: PHYSICAL THERAPY | Facility: CLINIC | Age: 19
End: 2024-07-12
Payer: OTHER GOVERNMENT

## 2024-07-12 DIAGNOSIS — S06.9X2D TRAUMATIC BRAIN INJURY WITH LOSS OF CONSCIOUSNESS OF 31 MINUTES TO 59 MINUTES, SUBSEQUENT ENCOUNTER: ICD-10-CM

## 2024-07-12 DIAGNOSIS — S24.102D T5 SPINAL CORD INJURY, SUBSEQUENT ENCOUNTER: Primary | ICD-10-CM

## 2024-07-12 DIAGNOSIS — R26.2 DIFFICULTY WALKING: ICD-10-CM

## 2024-07-12 NOTE — PROGRESS NOTES
Physical Therapy Daily Treatment Note                      [facility] [address] [place] [address]    Patient: Ehsan Woodson   : 2005  Diagnosis/ICD-10 Code:  T5 spinal cord injury, subsequent encounter [S24.102D]  Referring practitioner: Gary Cash MD  Date of Initial Visit: Type: THERAPY  Noted: 2024  Today's Date: 2024  Patient seen for 14 sessions           Subjective   The patient reported that she is good today. Pt has been walking her mother without the walker. She is practicing stairs.     Objective   See Exercise, Manual, and Modality Logs for complete treatment.     Assessment/Plan  Pt tolerated today's session well. Pt continues to show good improvement. Pt was able to ambulate a total of 280' (with two sitting rest breaks) with her RW  today, which is an improvement even compared to last session. This shows improved LE endurance. Pt;'s core control is improving, as evidenced by ability to perform paloff press and around-the-worlds in standing today with Min A for balance. However, pt had extreme difficulty stepping over flat foams today due to continued spasticity of L quadriceps, which makes flexing the knee during L swing phase very challenging. For this reason, pt required Min tactile assist at the L LE during this activity. There was an incident today, where pt was stepping over foams with her RW (therapist was providing assist at the L LE and pt's mother was following behind with the wheelchair in case pt needed to sit), and pt tried to sit and/or her knee gave out; she clipped the edge of the wheelchair with her bottom and then slid onto the floor onto her buttocks. Pt was not injured. Pt was wearing a gait belt. Pt was able to get into her wheelchair with Min A x 2. Pt was able to complete therapy participation for the day and showed no adverse signs. Skilled therapy still required in order to continue addressing impairments so that pt can have increased  independence and continue working towards her goal of unassisted ambulation. Continue POC.        Timed:  Manual Therapy:    0     mins  97369;  Therapeutic Exercise:    10     mins  56310;     Neuromuscular Rachana:   0    mins  64357;    Therapeutic Activity:     33     mins  15201;     Gait Training:      10     mins  91096;     Aquatics                         0      mins  00732    Un-timed:  Mechanical Traction      0     mins  37519  Dry Needling     0     mins self-pay  Electrical Stimulation:    0     mins  63525 ( );      Timed Treatment:   53   mins   Total Treatment:     53   mins    Mana Awad PT    Electronically signed 7/12/2024    [place] License: PT - [id number]

## 2024-07-15 ENCOUNTER — TREATMENT (OUTPATIENT)
Dept: PHYSICAL THERAPY | Facility: CLINIC | Age: 19
End: 2024-07-15
Payer: OTHER GOVERNMENT

## 2024-07-15 ENCOUNTER — OFFICE VISIT (OUTPATIENT)
Dept: FAMILY MEDICINE CLINIC | Facility: CLINIC | Age: 19
End: 2024-07-15
Payer: OTHER GOVERNMENT

## 2024-07-15 VITALS
SYSTOLIC BLOOD PRESSURE: 100 MMHG | TEMPERATURE: 97 F | OXYGEN SATURATION: 98 % | HEIGHT: 69 IN | DIASTOLIC BLOOD PRESSURE: 70 MMHG | BODY MASS INDEX: 20.14 KG/M2 | HEART RATE: 115 BPM | WEIGHT: 136 LBS

## 2024-07-15 DIAGNOSIS — S24.102D T5 SPINAL CORD INJURY, SUBSEQUENT ENCOUNTER: Primary | ICD-10-CM

## 2024-07-15 DIAGNOSIS — G47.00 INSOMNIA, UNSPECIFIED TYPE: ICD-10-CM

## 2024-07-15 DIAGNOSIS — M62.89 MUSCLE STIFFNESS: ICD-10-CM

## 2024-07-15 DIAGNOSIS — G82.20 PARAPLEGIA: ICD-10-CM

## 2024-07-15 DIAGNOSIS — S22.049K: ICD-10-CM

## 2024-07-15 DIAGNOSIS — D50.9 IRON DEFICIENCY ANEMIA, UNSPECIFIED IRON DEFICIENCY ANEMIA TYPE: ICD-10-CM

## 2024-07-15 DIAGNOSIS — F41.9 ANXIETY: ICD-10-CM

## 2024-07-15 DIAGNOSIS — E55.9 VITAMIN D DEFICIENCY: ICD-10-CM

## 2024-07-15 DIAGNOSIS — S06.9X2D TRAUMATIC BRAIN INJURY WITH LOSS OF CONSCIOUSNESS OF 31 MINUTES TO 59 MINUTES, SUBSEQUENT ENCOUNTER: ICD-10-CM

## 2024-07-15 DIAGNOSIS — R26.2 DIFFICULTY WALKING: ICD-10-CM

## 2024-07-15 DIAGNOSIS — K59.00 CONSTIPATION, UNSPECIFIED CONSTIPATION TYPE: ICD-10-CM

## 2024-07-15 DIAGNOSIS — Z00.00 ANNUAL PHYSICAL EXAM: Primary | ICD-10-CM

## 2024-07-15 DIAGNOSIS — Z11.59 NEED FOR HEPATITIS C SCREENING TEST: ICD-10-CM

## 2024-07-15 LAB
25(OH)D3 SERPL-MCNC: 48.2 NG/ML (ref 30–100)
ALBUMIN SERPL-MCNC: 4.5 G/DL (ref 3.5–5.2)
ALBUMIN/GLOB SERPL: 1.6 G/DL
ALP SERPL-CCNC: 118 U/L (ref 39–117)
ALT SERPL W P-5'-P-CCNC: 11 U/L (ref 1–33)
ANION GAP SERPL CALCULATED.3IONS-SCNC: 11.8 MMOL/L (ref 5–15)
AST SERPL-CCNC: 17 U/L (ref 1–32)
BASOPHILS # BLD AUTO: 0.03 10*3/MM3 (ref 0–0.2)
BASOPHILS NFR BLD AUTO: 0.6 % (ref 0–1.5)
BILIRUB SERPL-MCNC: <0.2 MG/DL (ref 0–1.2)
BUN SERPL-MCNC: 8 MG/DL (ref 6–20)
BUN/CREAT SERPL: 10 (ref 7–25)
CALCIUM SPEC-SCNC: 9.7 MG/DL (ref 8.6–10.5)
CHLORIDE SERPL-SCNC: 104 MMOL/L (ref 98–107)
CHOLEST SERPL-MCNC: 162 MG/DL (ref 0–200)
CO2 SERPL-SCNC: 25.2 MMOL/L (ref 22–29)
CREAT SERPL-MCNC: 0.8 MG/DL (ref 0.57–1)
DEPRECATED RDW RBC AUTO: 34.3 FL (ref 37–54)
EGFRCR SERPLBLD CKD-EPI 2021: 107.7 ML/MIN/1.73
EOSINOPHIL # BLD AUTO: 0.08 10*3/MM3 (ref 0–0.4)
EOSINOPHIL NFR BLD AUTO: 1.6 % (ref 0.3–6.2)
ERYTHROCYTE [DISTWIDTH] IN BLOOD BY AUTOMATED COUNT: 13.4 % (ref 12.3–15.4)
GLOBULIN UR ELPH-MCNC: 2.9 GM/DL
GLUCOSE SERPL-MCNC: 59 MG/DL (ref 65–99)
HCT VFR BLD AUTO: 38.4 % (ref 34–46.6)
HCV AB SER QL: NORMAL
HDLC SERPL-MCNC: 64 MG/DL (ref 40–60)
HGB BLD-MCNC: 11.5 G/DL (ref 12–15.9)
IMM GRANULOCYTES # BLD AUTO: 0.01 10*3/MM3 (ref 0–0.05)
IMM GRANULOCYTES NFR BLD AUTO: 0.2 % (ref 0–0.5)
LDLC SERPL CALC-MCNC: 83 MG/DL (ref 0–100)
LDLC/HDLC SERPL: 1.29 {RATIO}
LYMPHOCYTES # BLD AUTO: 1.57 10*3/MM3 (ref 0.7–3.1)
LYMPHOCYTES NFR BLD AUTO: 30.5 % (ref 19.6–45.3)
MCH RBC QN AUTO: 21.8 PG (ref 26.6–33)
MCHC RBC AUTO-ENTMCNC: 29.9 G/DL (ref 31.5–35.7)
MCV RBC AUTO: 72.7 FL (ref 79–97)
MONOCYTES # BLD AUTO: 0.26 10*3/MM3 (ref 0.1–0.9)
MONOCYTES NFR BLD AUTO: 5.1 % (ref 5–12)
NEUTROPHILS NFR BLD AUTO: 3.19 10*3/MM3 (ref 1.7–7)
NEUTROPHILS NFR BLD AUTO: 62 % (ref 42.7–76)
PLATELET # BLD AUTO: 198 10*3/MM3 (ref 140–450)
PMV BLD AUTO: 12.3 FL (ref 6–12)
POTASSIUM SERPL-SCNC: 3.7 MMOL/L (ref 3.5–5.2)
PROT SERPL-MCNC: 7.4 G/DL (ref 6–8.5)
RBC # BLD AUTO: 5.28 10*6/MM3 (ref 3.77–5.28)
SODIUM SERPL-SCNC: 141 MMOL/L (ref 136–145)
TRIGL SERPL-MCNC: 77 MG/DL (ref 0–150)
TSH SERPL DL<=0.05 MIU/L-ACNC: 4.98 UIU/ML (ref 0.27–4.2)
VLDLC SERPL-MCNC: 15 MG/DL (ref 5–40)
WBC NRBC COR # BLD AUTO: 5.14 10*3/MM3 (ref 3.4–10.8)

## 2024-07-15 PROCEDURE — 80061 LIPID PANEL: CPT | Performed by: STUDENT IN AN ORGANIZED HEALTH CARE EDUCATION/TRAINING PROGRAM

## 2024-07-15 PROCEDURE — 82306 VITAMIN D 25 HYDROXY: CPT | Performed by: STUDENT IN AN ORGANIZED HEALTH CARE EDUCATION/TRAINING PROGRAM

## 2024-07-15 PROCEDURE — 83540 ASSAY OF IRON: CPT | Performed by: STUDENT IN AN ORGANIZED HEALTH CARE EDUCATION/TRAINING PROGRAM

## 2024-07-15 PROCEDURE — 99395 PREV VISIT EST AGE 18-39: CPT | Performed by: STUDENT IN AN ORGANIZED HEALTH CARE EDUCATION/TRAINING PROGRAM

## 2024-07-15 PROCEDURE — 84443 ASSAY THYROID STIM HORMONE: CPT | Performed by: STUDENT IN AN ORGANIZED HEALTH CARE EDUCATION/TRAINING PROGRAM

## 2024-07-15 PROCEDURE — 86803 HEPATITIS C AB TEST: CPT | Performed by: STUDENT IN AN ORGANIZED HEALTH CARE EDUCATION/TRAINING PROGRAM

## 2024-07-15 PROCEDURE — 85025 COMPLETE CBC W/AUTO DIFF WBC: CPT | Performed by: STUDENT IN AN ORGANIZED HEALTH CARE EDUCATION/TRAINING PROGRAM

## 2024-07-15 PROCEDURE — 84466 ASSAY OF TRANSFERRIN: CPT | Performed by: STUDENT IN AN ORGANIZED HEALTH CARE EDUCATION/TRAINING PROGRAM

## 2024-07-15 PROCEDURE — 80053 COMPREHEN METABOLIC PANEL: CPT | Performed by: STUDENT IN AN ORGANIZED HEALTH CARE EDUCATION/TRAINING PROGRAM

## 2024-07-15 PROCEDURE — 82728 ASSAY OF FERRITIN: CPT | Performed by: STUDENT IN AN ORGANIZED HEALTH CARE EDUCATION/TRAINING PROGRAM

## 2024-07-15 RX ORDER — GABAPENTIN 300 MG/1
300 CAPSULE ORAL 3 TIMES DAILY
Qty: 180 CAPSULE | Refills: 0 | Status: SHIPPED | OUTPATIENT
Start: 2024-07-15

## 2024-07-15 RX ORDER — MIDODRINE HYDROCHLORIDE 2.5 MG/1
2.5 TABLET ORAL
Qty: 270 TABLET | Refills: 2 | Status: SHIPPED | OUTPATIENT
Start: 2024-07-15

## 2024-07-15 RX ORDER — CYCLOBENZAPRINE HCL 5 MG
5 TABLET ORAL 2 TIMES DAILY PRN
Qty: 180 TABLET | Refills: 0 | Status: SHIPPED | OUTPATIENT
Start: 2024-07-15 | End: 2024-10-13

## 2024-07-15 RX ORDER — CYCLOBENZAPRINE HCL 5 MG
5 TABLET ORAL NIGHTLY PRN
Qty: 30 TABLET | Refills: 0 | Status: SHIPPED | OUTPATIENT
Start: 2024-07-15 | End: 2024-07-15
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
## 2024-07-15 NOTE — PROGRESS NOTES
Outpatient Physical Therapy  [address] [facility], [place] [address]                            Physical Therapy Daily Treatment Note    Patient: Ehsan Woodson   : 2005  Diagnosis/ICD-10 Code:  T5 spinal cord injury, subsequent encounter [S24.102D]  Referring practitioner: Gary Cash MD  Date of Initial Visit: Type: THERAPY  Noted: 2024  Today's Date: 7/15/2024  Patient seen for 15 sessions           Subjective   Ehsan Woodson reports: that she has been trying to practice standing up without using her hands.     Objective   General fatigue and increase in clonus by end of PT session.     See Exercise, Manual, and Modality Logs for complete treatment.     Assessment/Plan  Ehsan progressing as evident by decreased falls and increased tolerance of PT session. Pt required  Solo Step  throughout PT session, sitting rest breaks throughout due to fatigue and clonus. Pt would benefit from skilled PT to address strength and endurance deficits, transfer and gait training and any concerns with ADLs.       Progress per Plan of Care         Timed:  Manual Therapy:        mins  38399;  Therapeutic Exercise:    15     mins  52791;     Neuromuscular Rachana:    10    mins  51946;    Therapeutic Activity:     15     mins  13190;     Gait Training:           mins  61654;          Timed Treatment:   40   mins   Total Treatment:     40   mins      Electronically signed:   Machelle Kevin PTA  Physical Therapist Assistant  [facility] License #: [id number]

## 2024-07-16 DIAGNOSIS — D50.9 IRON DEFICIENCY ANEMIA, UNSPECIFIED IRON DEFICIENCY ANEMIA TYPE: Primary | ICD-10-CM

## 2024-07-16 LAB
FERRITIN SERPL-MCNC: 192 NG/ML (ref 13–150)
IRON 24H UR-MRATE: 89 MCG/DL (ref 37–145)
IRON SATN MFR SERPL: 23 % (ref 20–50)
TIBC SERPL-MCNC: 393 MCG/DL (ref 298–536)
TRANSFERRIN SERPL-MCNC: 264 MG/DL (ref 200–360)

## 2024-07-17 ENCOUNTER — TREATMENT (OUTPATIENT)
Dept: PHYSICAL THERAPY | Facility: CLINIC | Age: 19
End: 2024-07-17
Payer: OTHER GOVERNMENT

## 2024-07-17 DIAGNOSIS — R26.2 DIFFICULTY WALKING: ICD-10-CM

## 2024-07-17 DIAGNOSIS — S24.102D T5 SPINAL CORD INJURY, SUBSEQUENT ENCOUNTER: Primary | ICD-10-CM

## 2024-07-17 DIAGNOSIS — S06.9X2D TRAUMATIC BRAIN INJURY WITH LOSS OF CONSCIOUSNESS OF 31 MINUTES TO 59 MINUTES, SUBSEQUENT ENCOUNTER: ICD-10-CM

## 2024-07-17 NOTE — PROGRESS NOTES
Physical Therapy Daily Treatment Note                      [facility] [address] [place] [address]    Patient: Ehsan Woodson   : 2005  Diagnosis/ICD-10 Code:  T5 spinal cord injury, subsequent encounter [S24.102D]  Referring practitioner: Gary Cash MD  Date of Initial Visit: Type: THERAPY  Noted: 2024  Today's Date: 2024  Patient seen for 16 sessions           Subjective   The patient reported that she is doing good today.     Objective   See Exercise, Manual, and Modality Logs for complete treatment.     Assessment/Plan  Pt tolerated today's session well. She was able to perform gait training on the treadmill in the harness today, for the first time. She did well, but required moderate-maximal facilitation at the L hamstring and anterior tibialis for foot clearance. Pt did well with pushing the sled forward while in the harness and was able to get into a lunge-like position. She demonstrated good power and push-off. However, she struggled heavily with backwards walking due to difficulties engaging the glutes and hamstrings. She required mod-max facilitation at the hamstrings, foot and hip flexors for this on the L LE. Skilled therapy still required in order to increase functional independence. Continue POC.        Timed:  Manual Therapy:    0     mins  87158;  Therapeutic Exercise:    0     mins  20289;     Neuromuscular Rachana:   0    mins  64187;    Therapeutic Activity:     23     mins  08009;     Gait Trainin     mins  51559;     Aquatics                         0      mins  94123    Un-timed:  Mechanical Traction      0     mins  89494  Dry Needling     0     mins self-pay  Electrical Stimulation:    0     mins  12273 ( );      Timed Treatment:   54   mins   Total Treatment:     54   mins    Mana Awad PT    Electronically signed 2024    [place] License: PT - [id number]

## 2024-07-18 ENCOUNTER — TREATMENT (OUTPATIENT)
Dept: PHYSICAL THERAPY | Facility: CLINIC | Age: 19
End: 2024-07-18
Payer: OTHER GOVERNMENT

## 2024-07-18 DIAGNOSIS — S24.102D T5 SPINAL CORD INJURY, SUBSEQUENT ENCOUNTER: Primary | ICD-10-CM

## 2024-07-18 DIAGNOSIS — S06.9X2D TRAUMATIC BRAIN INJURY WITH LOSS OF CONSCIOUSNESS OF 31 MINUTES TO 59 MINUTES, SUBSEQUENT ENCOUNTER: ICD-10-CM

## 2024-07-18 DIAGNOSIS — R26.2 DIFFICULTY WALKING: ICD-10-CM

## 2024-07-18 NOTE — PROGRESS NOTES
Physical Therapy Daily Treatment Note                      [facility] [address] [facility] [place] [address]    Patient: Ehsan Woodson   : 2005  Diagnosis/ICD-10 Code:  T5 spinal cord injury, subsequent encounter [S24.102D]  Referring practitioner: Gary Cash MD  Date of Initial Visit: Type: THERAPY  Noted: 2024  Today's Date: 2024  Patient seen for 17 sessions           Subjective   The patient reported that she is doing good today.     Objective   See Exercise, Manual, and Modality Logs for complete treatment.     Assessment/Plan    Pt tolerated today's session very well. It is evident that her core control is improving based on the fact that she is able to  the harness and perform paloof press. She is also able to sit on the theraball and perform marches, which exaggerates her weight shift and requires her to activate her core. L hip flexion control is improving. Pt is improving with ambulation, as evidenced by the fact that she was able to ambulate in the harness without the walker today. Although, she did require B HHA modAx2 for balance. Skilled therapy still required in order to increase functional independence. Continue POC.          Timed:  Manual Therapy:    0     mins  02156;  Therapeutic Exercise:    0     mins  27299;     Neuromuscular Rachana:   25    mins  66476;    Therapeutic Activity:     21     mins  21533;     Gait Training:      10     mins  95821;     Aquatics                         0      mins  73014    Un-timed:  Mechanical Traction      0     mins  08825  Dry Needling     0     mins self-pay  Electrical Stimulation:    0     mins  79879 ( );      Timed Treatment:   56   mins   Total Treatment:     56   mins    Mana Awad PT    Electronically signed 2024    [place] License: PT - [id number]

## 2024-07-22 ENCOUNTER — TREATMENT (OUTPATIENT)
Dept: PHYSICAL THERAPY | Facility: CLINIC | Age: 19
End: 2024-07-22
Payer: OTHER GOVERNMENT

## 2024-07-22 ENCOUNTER — TELEPHONE (OUTPATIENT)
Dept: FAMILY MEDICINE CLINIC | Facility: CLINIC | Age: 19
End: 2024-07-22
Payer: OTHER GOVERNMENT

## 2024-07-22 DIAGNOSIS — S06.9X2D TRAUMATIC BRAIN INJURY WITH LOSS OF CONSCIOUSNESS OF 31 MINUTES TO 59 MINUTES, SUBSEQUENT ENCOUNTER: ICD-10-CM

## 2024-07-22 DIAGNOSIS — R26.2 DIFFICULTY WALKING: ICD-10-CM

## 2024-07-22 DIAGNOSIS — S24.102D T5 SPINAL CORD INJURY, SUBSEQUENT ENCOUNTER: Primary | ICD-10-CM

## 2024-07-22 PROCEDURE — 97110 THERAPEUTIC EXERCISES: CPT | Performed by: PHYSICAL THERAPIST

## 2024-07-22 PROCEDURE — 97530 THERAPEUTIC ACTIVITIES: CPT | Performed by: PHYSICAL THERAPIST

## 2024-07-22 PROCEDURE — 97112 NEUROMUSCULAR REEDUCATION: CPT | Performed by: PHYSICAL THERAPIST

## 2024-07-22 NOTE — PROGRESS NOTES
Outpatient Physical Therapy  [address] [facility], [place] [address]                            Physical Therapy Daily Treatment Note    Patient: Ehsan Woodson   : 2005  Diagnosis/ICD-10 Code:  T5 spinal cord injury, subsequent encounter [S24.102D]  Referring practitioner: Gary Cash MD  Date of Initial Visit: Type: THERAPY  Noted: 2024  Today's Date: 2024  Patient seen for 18 sessions           Subjective   Ehsan Woodson reports: she just started  week for summer school, so she is extra tired today.     Objective   Increased fatigue by end of PT session, even an increase in clonus in BLE.  Attempted Monster Walks with red band but to fatigued.     Improved L hip flexor strength when pushing sled.     See Exercise, Manual, and Modality Logs for complete treatment.     Assessment/Plan  Ehsan progressing as evident by increased tolerance of PT session, although fatigued by end of PT session. Pt required  SOLO Step  throughout PT session, used to sit and rest legs throughout. Pt would benefit from skilled PT to address strength and endurance deficits, transfer and gait training and any concerns with ADLs.       Progress per Plan of Care         Timed:  Manual Therapy:        mins  45122;  Therapeutic Exercise:    15     mins  21545;     Neuromuscular Rachana:    10    mins  19346;    Therapeutic Activity:     15     mins  80963;     Gait Training:           mins  42307;      Timed Treatment:   40   mins   Total Treatment:     40   mins      Electronically signed:   Machelle Kevin PTA  Physical Therapist Assistant  [facility] License #: [id number]

## 2024-07-22 NOTE — TELEPHONE ENCOUNTER
----- Message from [facility]le sent at 7/18/2024  7:31 PM EDT -----  Regarding: Voice   Contact: [phone]  Good afternoon , I just wanted to email you about my voice . It’s currently been gone for a week and I read in my chart about my thyroid. I don’t know if it’s causing my voice to be gone or what but it’s affecting my speech .

## 2024-07-23 ENCOUNTER — TREATMENT (OUTPATIENT)
Dept: PHYSICAL THERAPY | Facility: CLINIC | Age: 19
End: 2024-07-23
Payer: OTHER GOVERNMENT

## 2024-07-23 DIAGNOSIS — R26.2 DIFFICULTY WALKING: ICD-10-CM

## 2024-07-23 DIAGNOSIS — S06.9X2D TRAUMATIC BRAIN INJURY WITH LOSS OF CONSCIOUSNESS OF 31 MINUTES TO 59 MINUTES, SUBSEQUENT ENCOUNTER: ICD-10-CM

## 2024-07-23 DIAGNOSIS — S24.102D T5 SPINAL CORD INJURY, SUBSEQUENT ENCOUNTER: Primary | ICD-10-CM
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
## 2024-07-25 ENCOUNTER — TREATMENT (OUTPATIENT)
Dept: PHYSICAL THERAPY | Facility: CLINIC | Age: 19
End: 2024-07-25
Payer: OTHER GOVERNMENT

## 2024-07-25 DIAGNOSIS — S24.102D T5 SPINAL CORD INJURY, SUBSEQUENT ENCOUNTER: Primary | ICD-10-CM

## 2024-07-25 DIAGNOSIS — R26.2 DIFFICULTY WALKING: ICD-10-CM

## 2024-07-25 DIAGNOSIS — S06.9X2D TRAUMATIC BRAIN INJURY WITH LOSS OF CONSCIOUSNESS OF 31 MINUTES TO 59 MINUTES, SUBSEQUENT ENCOUNTER: ICD-10-CM

## 2024-07-25 NOTE — PROGRESS NOTES
"   Physical Therapy Daily Treatment Note                      [facility] [address] [place] [address]    Patient: Ehsan Woodson   : 2005  Diagnosis/ICD-10 Code:  T5 spinal cord injury, subsequent encounter [S24.102D]  Referring practitioner: Gary Cash MD  Date of Initial Visit: Type: THERAPY  Noted: 2024  Today's Date: 2024  Patient seen for 20 sessions           Subjective   The patient reported that she is good today. Pt has not been lying prone or stretching her quads at home yet.    Objective   See Exercise, Manual, and Modality Logs for complete treatment.     Assessment/Plan  Pt tolerated today's session well. Pt was able to walk 3 consecutive laps with the RW, again, showing maintenance of endurance. Pt still having significant difficulty performing 4\" step-ups due to quadriceps tone of L leg. She has severe difficulty lifting L LE up to the step and then lifting it back down. Her toe drags due to L DF weakness and decreased ability to flex her knee. Therapist informed pt that she needs to start lying prone and stretching her quads at home so that her hip flexors don't develop contractures. Skilled therapy still required in order to continue improving her coordination, LE endurance, LE strength, balance and gait so that she can return to her PLOF with activities, including ambulating without an AD and so that she has increased independence. Continue POC.        Timed:  Manual Therapy:    0     mins  16026;  Therapeutic Exercise:    0     mins  79231;     Neuromuscular Rachana:   8    mins  83214;    Therapeutic Activity:     30     mins  82052;     Gait Training:      15     mins  93892;     Aquatics                         0      mins  69364    Un-timed:  Mechanical Traction      0     mins  87806  Dry Needling     0     mins self-pay  Electrical Stimulation:    0     mins  29578 ( );      Timed Treatment:   53   mins   Total Treatment:     53   mins    Mana " DEMETRIUS Awad    Electronically signed 7/25/2024    [place] License: PT - [id number]

## 2024-07-26 ENCOUNTER — TREATMENT (OUTPATIENT)
Dept: PHYSICAL THERAPY | Facility: CLINIC | Age: 19
End: 2024-07-26
Payer: OTHER GOVERNMENT

## 2024-07-26 DIAGNOSIS — R26.2 DIFFICULTY WALKING: ICD-10-CM

## 2024-07-26 DIAGNOSIS — S06.9X2D TRAUMATIC BRAIN INJURY WITH LOSS OF CONSCIOUSNESS OF 31 MINUTES TO 59 MINUTES, SUBSEQUENT ENCOUNTER: ICD-10-CM

## 2024-07-26 DIAGNOSIS — S24.102D T5 SPINAL CORD INJURY, SUBSEQUENT ENCOUNTER: Primary | ICD-10-CM

## 2024-07-26 NOTE — PROGRESS NOTES
Progress Note  [facility] [address] [place] [address]      Patient: Ehsan Woodson   : 2005  Diagnosis/ICD-10 Code:  T5 spinal cord injury, subsequent encounter [S24.102D]  Referring practitioner: Gary Cash MD  Date of Initial Visit: Type: THERAPY  Noted: 2024  Today's Date: 2024  Patient seen for 21 sessions      Subjective:   Subjective Questionnaire:  OPTIMAL: 3 activities you would like to be able to do w/o any difficulty (10,11,16).  Primary goal: 11    DIFFICULTY: 16 score  CONFIDENCE: 13 score     All Questions:   DIFFICULTY:98  CONFIDENCE: 66    Clinical Progress: improved  Home Program Compliance: Yes  Treatment has included: therapeutic exercise, neuromuscular re-education, manual therapy, therapeutic activity, and gait training    Subjective    Pt reports that she is doing good today. Pt reports that she laid on her stomach for about an hour last night.     Objective         Left Hip   Planes of Motion   Flexion: 5     Right Hip   Planes of Motion   Flexion: 5     Left Knee   Flexion: 4-  Extension: 5     Right Knee   Flexion: 5  Extension: 5     Left Ankle/Foot   Dorsiflexion: 2-     Right Ankle/Foot   Dorsiflexion: 4+     Testing:   Standing with RW (mostly not touching walker): 4 minutes SBA     Amb 420 feet with RW CGA x 1     See Exercise, Manual, and Modality Logs for complete treatment.     Assessment/Plan    Pt tolerated today's session well. Today was reassessment day. Pt has made great improvements since her last progress note. Her strength measurements have improved. Her L ankle DF has improved, indicating she is obtaining more activation of the anterior tibialis, whereas she was unable to activate it at all 30 days ago. Pt was able to meet her a standing goal, a walking goal and a transfer goal today, indicating good improvements in LE strength and endurance. Pt was able to ambulate with 2 crutches while in the harness today, which went well. However, pt  did lose her balance a couple of times, which required the harness to catch her. Pt still has increased quad tone on the left, which makes advancement of the L LE during swing phase difficult. Clearance of the L foot during swing phase is made difficult by the following: L DF weakness, L hip flexor weakness and increased L quadriceps tone. Pt also still gets fatigued very quickly, which lead to LE clonus. Pt is not yet safe to walk indep at home with a walker, but is safe with CGA. Pt requires continued skilled PT services in order to address deficits limiting independence during ADLs, such as transfers, ambulating, indep standing, etc. Continue POC.       Goals  Plan Goals: 1. Mobility: Walking/Moving Around Functional Limitation                      LTG 1: 20 weeks: The patient will be able to ambulate 100 ft with rolling walker with min A in order to improve function and independence with ADLs.  STATUS: MET  STG 1: 12 weeks: The patient will be able to ambulate 15 ft with rolling walker with min A in order to improve function and independence with ADLs.  STATUS: MET  LTG 1b: 20 weeks: The patient will be able to ambulate 140 ft with walking sticks or [place]strand crutches min A in order to improve function and independence with ADLs.  STATUS: NEW  STG 1b: 20 weeks: The patient will be able to ambulate 50 ft with walking sticks or Lofstrand crutches min A in order to improve function and independence with ADLs.  STATUS: NEW     2. Mobility: Walking/Moving Around Functional Limitation                      LTG 2: 12 weeks: The patient will be able to stand independently for 4 minutes with rolling walker to improve function during ADLs such as cooking, grooming her hair and other activities.  STATUS: MET  STG 2: 6 weeks: The patient will be able to stand with CGA with rolling walker for 2 minutes to improve function during ADLs such as cooking, grooming her hair and other activities.  STATUS: MET  LTG 2b: 12 weeks: The  patient will be able to stand independently for 4 minutes in order to improve function during ADLs such as cooking, grooming her hair and other activities.  STATUS: NEW  STG 2b: 6 weeks: The patient will be able to stand indep for 2 minutes to improve function during ADLs such as cooking, grooming her hair and other activities.  STATUS: NEW     3. Mobility: Walking/Moving Around Functional Limitation                      LTG 2: 12 weeks: The patient will be able to complete sit to/from stand transfers independently.  STATUS: MET  STG 2: 6 weeks: The patient will be able to complete sit to/from stand transfers with CGA.  STATUS: MET     4. Mobility: Walking/Moving Around Functional Limitation                   LTG 4: 20 weeks: The patient will demonstrate improved function by scoring a 55 on the Optimal.  STATUS: progressing  STG 4: 6 weeks: The patient will demonstrate improved function by scoring a 70 on the Optimal.  STATUS: progressing    Progress toward previous goals: Partially Met      Recommendations: Continue as planned  Timeframe:  6 months   Prognosis to achieve goals: good    Signature: Mana Awad PT    Electronically signed 2024    [place] License: PT - [id number]      Timed:  Manual Therapy:    0     mins  62845;  Therapeutic Exercise:    0     mins  95677;     Neuromuscular Rachana:    0    mins  65398;    Therapeutic Activity:     41     mins  69047;     Gait Trainin     mins  39516;     Aquatics                         0      mins  67063    Un-timed:  Mechanical Traction      0     mins  35950  Dry Needling     0     mins self-pay  Electrical Stimulation:    0     mins  28819 ( );    Timed Treatment:   54   mins   Total Treatment:     54   mins

## 2024-07-31 ENCOUNTER — TREATMENT (OUTPATIENT)
Dept: PHYSICAL THERAPY | Facility: CLINIC | Age: 19
End: 2024-07-31
Payer: OTHER GOVERNMENT

## 2024-07-31 DIAGNOSIS — R26.2 DIFFICULTY WALKING: ICD-10-CM

## 2024-07-31 DIAGNOSIS — S06.9X2D TRAUMATIC BRAIN INJURY WITH LOSS OF CONSCIOUSNESS OF 31 MINUTES TO 59 MINUTES, SUBSEQUENT ENCOUNTER: ICD-10-CM

## 2024-07-31 DIAGNOSIS — S24.102D T5 SPINAL CORD INJURY, SUBSEQUENT ENCOUNTER: Primary | ICD-10-CM

## 2024-07-31 NOTE — PROGRESS NOTES
Outpatient Physical Therapy  [address] [facility], [place] [address]                            Physical Therapy Daily Treatment Note    Patient: Ehsan Woodson   : 2005  Diagnosis/ICD-10 Code:  T5 spinal cord injury, subsequent encounter [S24.102D]  Referring practitioner: Gary Cash MD  Date of Initial Visit: Type: THERAPY  Noted: 2024  Today's Date: 2024  Patient seen for 22 sessions           Subjective   Ehsan Woodson reports: that she is well rested today, she has been up and walking with her mom at home the last few days.     Objective   Focused on control of LLE when advancing to dots.   Multiple verbal cues for upright posture and engaging core.    Mod A to get her into Half Kneeling position, then able to use CGA/Min A for upright posture    See Exercise, Manual, and Modality Logs for complete treatment.     Assessment/Plan  Ehsan progressing as evident by increased tolerance of PT session. Pt required  SOLO Step  throughout PT session, verbal and tactile cues for upright posture and engaging core. Pt would benefit from skilled PT to address strength and endurance deficits, transfer and gait training and any concerns with ADLs.       Progress per Plan of Care         Timed:  Manual Therapy:        mins  37597;  Therapeutic Exercise:    15     mins  53496;     Neuromuscular Rachana:   15     mins  53680;    Therapeutic Activity:     15     mins  42796;     Gait Training:           mins  68893;        Timed Treatment:   45   mins   Total Treatment:     45   mins      Electronically signed:   Machelle Kevin PTA  Physical Therapist Assistant  [facility] License #: [id number]

## 2024-08-02 ENCOUNTER — TREATMENT (OUTPATIENT)
Dept: PHYSICAL THERAPY | Facility: CLINIC | Age: 19
End: 2024-08-02
Payer: OTHER GOVERNMENT

## 2024-08-02 DIAGNOSIS — R26.2 DIFFICULTY WALKING: ICD-10-CM

## 2024-08-02 DIAGNOSIS — S06.9X2D TRAUMATIC BRAIN INJURY WITH LOSS OF CONSCIOUSNESS OF 31 MINUTES TO 59 MINUTES, SUBSEQUENT ENCOUNTER: ICD-10-CM

## 2024-08-02 DIAGNOSIS — S24.102D T5 SPINAL CORD INJURY, SUBSEQUENT ENCOUNTER: Primary | ICD-10-CM

## 2024-08-02 NOTE — PROGRESS NOTES
Outpatient Physical Therapy  [address] [facility], [place] [address]                            Physical Therapy Daily Treatment Note    Patient: Ehsan Woodson   : 2005  Diagnosis/ICD-10 Code:  T5 spinal cord injury, subsequent encounter [S24.102D]  Referring practitioner: Gary Cash MD  Date of Initial Visit: Type: THERAPY  Noted: 2024  Today's Date: 2024  Patient seen for 23 sessions           Subjective   Ehsan Woodson reports: legs are feeling pretty good.     Objective   General fatigue and increase in clonus with continued exercise.  Decreased strength with sidelying hip abd and prone hip ext    See Exercise, Manual, and Modality Logs for complete treatment.     Assessment/Plan  Ehsan progressing as evident by increased tolerance of PT session. Pt required  SOLO Step  with standing exercises. Pt would benefit from skilled PT to address strength and endurance deficits, transfer and gait training and any concerns with ADLs.       Progress per Plan of Care         Timed:  Manual Therapy:        mins  41290;  Therapeutic Exercise:    15     mins  30422;     Neuromuscular Rachana:    10    mins  86541;    Therapeutic Activity:     15     mins  35747;     Gait Training:           mins  06220;        Timed Treatment:   40   mins   Total Treatment:     40   mins      Electronically signed:   Machelle Kevin PTA  Physical Therapist Assistant  [facility] License #: [id number]

## 2024-08-06 ENCOUNTER — TELEPHONE (OUTPATIENT)
Dept: FAMILY MEDICINE CLINIC | Facility: CLINIC | Age: 19
End: 2024-08-06

## 2024-08-06 ENCOUNTER — TREATMENT (OUTPATIENT)
Dept: PHYSICAL THERAPY | Facility: CLINIC | Age: 19
End: 2024-08-06
Payer: OTHER GOVERNMENT

## 2024-08-06 ENCOUNTER — OFFICE VISIT (OUTPATIENT)
Dept: FAMILY MEDICINE CLINIC | Facility: CLINIC | Age: 19
End: 2024-08-06
Payer: OTHER GOVERNMENT

## 2024-08-06 VITALS
DIASTOLIC BLOOD PRESSURE: 68 MMHG | WEIGHT: 136 LBS | TEMPERATURE: 97.6 F | HEART RATE: 115 BPM | SYSTOLIC BLOOD PRESSURE: 114 MMHG | BODY MASS INDEX: 20.14 KG/M2 | OXYGEN SATURATION: 98 % | HEIGHT: 69 IN

## 2024-08-06 DIAGNOSIS — S22.049K: ICD-10-CM

## 2024-08-06 DIAGNOSIS — R49.0 HOARSENESS OF VOICE: Primary | ICD-10-CM

## 2024-08-06 DIAGNOSIS — R26.2 DIFFICULTY WALKING: ICD-10-CM

## 2024-08-06 DIAGNOSIS — G62.9 NEUROPATHY: ICD-10-CM

## 2024-08-06 DIAGNOSIS — S24.102D T5 SPINAL CORD INJURY, SUBSEQUENT ENCOUNTER: Primary | ICD-10-CM

## 2024-08-06 DIAGNOSIS — S06.9X2D TRAUMATIC BRAIN INJURY WITH LOSS OF CONSCIOUSNESS OF 31 MINUTES TO 59 MINUTES, SUBSEQUENT ENCOUNTER: ICD-10-CM

## 2024-08-06 PROCEDURE — 99214 OFFICE O/P EST MOD 30 MIN: CPT | Performed by: STUDENT IN AN ORGANIZED HEALTH CARE EDUCATION/TRAINING PROGRAM

## 2024-08-06 RX ORDER — GABAPENTIN 300 MG/1
300 CAPSULE ORAL 3 TIMES DAILY
Qty: 270 CAPSULE | Refills: 0 | Status: SHIPPED | OUTPATIENT
Start: 2024-08-06 | End: 2024-11-04

## 2024-08-06 NOTE — PROGRESS NOTES
Outpatient Physical Therapy  [address] [facility], [place] [address]                            Physical Therapy Daily Treatment Note    Patient: Ehsan Woodson   : 2005  Diagnosis/ICD-10 Code:  T5 spinal cord injury, subsequent encounter [S24.102D]  Referring practitioner: Gary Cash MD  Date of Initial Visit: Type: THERAPY  Noted: 2024  Today's Date: 2024  Patient seen for 24 sessions           Subjective   Ehsan Woodson reports that she has been up and walking and using her walker at home. She almost didn't use her wheelchair to come in today.     Objective   Increased fatigue by end of PT session.  Encouraging L hip flexion throughout PT session    See Exercise, Manual, and Modality Logs for complete treatment.     Assessment/Plan  Ehsan progressing as evident by increased tolerance of PT session. Pt required SOLO Step throughout PT session, used multiple times to rest. Pt would benefit from skilled PT to address strength and endurance deficits, transfer and gait training and any concerns with ADLs.      Progress per Plan of Care         Timed:  Manual Therapy:        mins  87514;  Therapeutic Exercise:    15     mins  46786;     Neuromuscular Rachana:    10    mins  47777;    Therapeutic Activity:     15     mins  89176;     Gait Training:           mins  71909;        Timed Treatment:   40   mins   Total Treatment:     40   mins      Electronically signed:   Machelle Kevin PTA  Physical Therapist Assistant  [facility] License #: [id number]

## 2024-08-06 NOTE — TELEPHONE ENCOUNTER
----- Message from [facility] IvoryCureVacjose de jesus sent at 8/5/2024 12:04 PM EDT -----  Regarding: Refill  Contact: [phone]  Efren , I wanted to let you know I will have used the last of my gabapentin my Wednesday and I’m in need of a refill . I have refills of all my meds except gabapentin because insurance said it was too early to refill . Now I’m out and in need of a refill lol .     Thanks , radha

## 2024-08-07 ENCOUNTER — TREATMENT (OUTPATIENT)
Dept: PHYSICAL THERAPY | Facility: CLINIC | Age: 19
End: 2024-08-07
Payer: OTHER GOVERNMENT

## 2024-08-07 DIAGNOSIS — S06.9X2D TRAUMATIC BRAIN INJURY WITH LOSS OF CONSCIOUSNESS OF 31 MINUTES TO 59 MINUTES, SUBSEQUENT ENCOUNTER: ICD-10-CM

## 2024-08-07 DIAGNOSIS — R26.2 DIFFICULTY WALKING: ICD-10-CM

## 2024-08-07 DIAGNOSIS — S24.102D T5 SPINAL CORD INJURY, SUBSEQUENT ENCOUNTER: Primary | ICD-10-CM
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
## 2024-08-07 NOTE — PROGRESS NOTES
Outpatient Physical Therapy  [address] [facility], [place] [address]                            Physical Therapy Daily Treatment Note    Patient: Ehsan Woodson   : 2005  Diagnosis/ICD-10 Code:  T5 spinal cord injury, subsequent encounter [S24.102D]  Referring practitioner: Gary Cash MD  Date of Initial Visit: Type: THERAPY  Noted: 2024  Today's Date: 2024  Patient seen for 25 sessions           Subjective   Ehsan Woodson reports: the legs were a little sore after having PT yesterday, states that she is walking more at home.    Objective   Multiple LOB caught by Solo Step, usually when turning or fatigued. Recovering from falls on her on at Min to Mod A vs Max A a few weeks ago.    See Exercise, Manual, and Modality Logs for complete treatment.     Assessment/Plan  Ehsan progressing as evident by increased tolerance of PT session. Pt using SOLO Step throughout PT session although not needing it for falls as often, even able to recover better on her own. Pt would benefit from skilled PT to address strength and endurance deficits, transfer and gait training and any concerns with ADLs.       Progress per Plan of Care         Timed:  Manual Therapy:        mins  47984;  Therapeutic Exercise:    15     mins  54991;     Neuromuscular Rachana:    10    mins  64544;    Therapeutic Activity:     15     mins  68171;     Gait Training:           mins  76727;      Timed Treatment:   40   mins   Total Treatment:     40   mins      Electronically signed:   Machelle Kevin PTA  Physical Therapist Assistant  [facility] License #: [id number]

## 2024-08-08 ENCOUNTER — TREATMENT (OUTPATIENT)
Dept: PHYSICAL THERAPY | Facility: CLINIC | Age: 19
End: 2024-08-08
Payer: OTHER GOVERNMENT

## 2024-08-08 DIAGNOSIS — S06.9X2D TRAUMATIC BRAIN INJURY WITH LOSS OF CONSCIOUSNESS OF 31 MINUTES TO 59 MINUTES, SUBSEQUENT ENCOUNTER: ICD-10-CM

## 2024-08-08 DIAGNOSIS — R26.2 DIFFICULTY WALKING: ICD-10-CM

## 2024-08-08 DIAGNOSIS — S24.102D T5 SPINAL CORD INJURY, SUBSEQUENT ENCOUNTER: Primary | ICD-10-CM
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
## 2024-08-13 ENCOUNTER — TREATMENT (OUTPATIENT)
Dept: PHYSICAL THERAPY | Facility: CLINIC | Age: 19
End: 2024-08-13
Payer: OTHER GOVERNMENT

## 2024-08-13 DIAGNOSIS — S06.9X2D TRAUMATIC BRAIN INJURY WITH LOSS OF CONSCIOUSNESS OF 31 MINUTES TO 59 MINUTES, SUBSEQUENT ENCOUNTER: ICD-10-CM

## 2024-08-13 DIAGNOSIS — R26.2 DIFFICULTY WALKING: ICD-10-CM

## 2024-08-13 DIAGNOSIS — S24.102D T5 SPINAL CORD INJURY, SUBSEQUENT ENCOUNTER: Primary | ICD-10-CM

## 2024-08-13 NOTE — PROGRESS NOTES
Physical Therapy Daily Treatment Note                      [facility] [address] [place] [address]    Patient: Ehsan Woodson   : 2005  Diagnosis/ICD-10 Code:  T5 spinal cord injury, subsequent encounter [S24.102D]  Referring practitioner: Gary Cash MD  Date of Initial Visit: Type: THERAPY  Noted: 2024  Today's Date: 2024  Patient seen for 27 sessions           Subjective  The patient reported that she has been walking at home with the walker. Her mom states that the pt has been able to do her laundry. She went up the stairs while her mom was standing behind her. Pt has gone to [facility] for 3 weeks in a row without her wheelchair. Pt not using her wheelchair today. Mother reports that pt can bend over and pick something up off the floor and come back up now.    Objective   See Exercise, Manual, and Modality Logs for complete treatment.     Assessment/Plan  Ehsan progressing as evident by increased tolerance of PT session. Pt using SOLO Step throughout PT session although not needing it for falls as often as she used to. Her reactive balance continues to improve. Pt able to perform planks and tall kneeling today, indicating improvements in core and glute strength respectively (although this was moderately to severely difficult for her). Pt would benefit from skilled PT to address strength and endurance deficits, transfer and gait training and any concerns with ADLs. Continue POC.        Timed:  Manual Therapy:    0     mins  56150;  Therapeutic Exercise:    8     mins  18881;     Neuromuscular Rachana:   17    mins  00259;    Therapeutic Activity:     20     mins  10392;     Gait Training:      10     mins  06594;     Aquatics                         0      mins  61998    Un-timed:  Mechanical Traction      0     mins  51788  Dry Needling     0     mins self-pay  Electrical Stimulation:    0     mins  37981 ( );      Timed Treatment:   55   mins   Total Treatment:     55    albino Awad PT    Electronically signed 8/13/2024    [place] License: PT - [id number]

## 2024-08-14 ENCOUNTER — TREATMENT (OUTPATIENT)
Dept: PHYSICAL THERAPY | Facility: CLINIC | Age: 19
End: 2024-08-14
Payer: OTHER GOVERNMENT

## 2024-08-14 DIAGNOSIS — R26.2 DIFFICULTY WALKING: ICD-10-CM

## 2024-08-14 DIAGNOSIS — S24.102D T5 SPINAL CORD INJURY, SUBSEQUENT ENCOUNTER: Primary | ICD-10-CM

## 2024-08-14 DIAGNOSIS — S06.9X2D TRAUMATIC BRAIN INJURY WITH LOSS OF CONSCIOUSNESS OF 31 MINUTES TO 59 MINUTES, SUBSEQUENT ENCOUNTER: ICD-10-CM

## 2024-08-14 NOTE — PROGRESS NOTES
Physical Therapy Daily Treatment Note                      [facility] [address] [facility] [place] [address]    Patient: Ehsan Woodson   : 2005  Diagnosis/ICD-10 Code:  T5 spinal cord injury, subsequent encounter [S24.102D]  Referring practitioner: Gary Cash MD  Date of Initial Visit: Type: THERAPY  Noted: 2024  Today's Date: 2024  Patient seen for 28 sessions           Subjective   The patient's mother reported that the pt was dragging her foot after last session.     Objective   See Exercise, Manual, and Modality Logs for complete treatment.     Assessment/Plan  Pt tolerated today's session well. Pt performed tall kneeling ball toss today with severe difficulty engaging her glutes. Pt had even more difficulty with half kneeling, which required her to engage one glute muscle at a time. Pt performed well with planks but required placement and static hold of L foot by PT. Pt seems to have significant tightness in B hips and mild tightness of low back, so PT gave her stretches to perform at home. Pt was significantly fatigued at the end of the session, as evidenced by L foot dragging during swing through of gait on her way out of the clinic. It might be time to consider AFO for L ankle. Pt requires continued skilled PT to address strength and endurance deficits, transfer and gait training and any concerns with ADLs. Continue POC.        Timed:  Manual Therapy:    0     mins  06380;  Therapeutic Exercise:    10     mins  40216;     Neuromuscular Rachana:   15    mins  54924;    Therapeutic Activity:     28     mins  78617;     Gait Trainin     mins  15124;     Aquatics                         0      mins  50909    Un-timed:  Mechanical Traction      0     mins  59307  Dry Needling     0     mins self-pay  Electrical Stimulation:    0     mins  82697 ( );      Timed Treatment:   53   mins   Total Treatment:     53   mins    Mana Awad PT    Electronically signed  8/14/2024    [place] License: PT - [id number]

## 2024-08-20 ENCOUNTER — TREATMENT (OUTPATIENT)
Dept: PHYSICAL THERAPY | Facility: CLINIC | Age: 19
End: 2024-08-20
Payer: OTHER GOVERNMENT

## 2024-08-20 DIAGNOSIS — S24.102D T5 SPINAL CORD INJURY, SUBSEQUENT ENCOUNTER: Primary | ICD-10-CM

## 2024-08-20 DIAGNOSIS — S06.9X2D TRAUMATIC BRAIN INJURY WITH LOSS OF CONSCIOUSNESS OF 31 MINUTES TO 59 MINUTES, SUBSEQUENT ENCOUNTER: ICD-10-CM

## 2024-08-20 DIAGNOSIS — R26.2 DIFFICULTY WALKING: ICD-10-CM

## 2024-08-20 NOTE — PROGRESS NOTES
Outpatient Physical Therapy  [address] [facility], [place] [address]                            Physical Therapy Daily Treatment Note    Patient: Ehsan Woodson   : 2005  Diagnosis/ICD-10 Code:  T5 spinal cord injury, subsequent encounter [S24.102D]  Referring practitioner: Gary Cash MD  Date of Initial Visit: Type: THERAPY  Noted: 2024  Today's Date: 2024  Patient seen for 29 sessions           Subjective   Ehsan Woodson reports to PT walking with her walker, states that she's already been out to visit her old PT place when she had her ACL surgery.     Objective   Mod A for upright posture as LE began to fatigue.    See Exercise, Manual, and Modality Logs for complete treatment.     Assessment/Plan  Ehsan progressing as evident by increased tolerance of PT session. Pt required moderate assistance throughout PT session, especially with fatigue. Pt would benefit from skilled PT to address strength and endurance deficits, transfer and gait training and any concerns with ADLs.       Progress per Plan of Care         Timed:  Manual Therapy:        mins  90281;  Therapeutic Exercise:    15     mins  23989;     Neuromuscular Rachana:    10    mins  78470;    Therapeutic Activity:     15     mins  31712;     Gait Training:           mins  08155;        Timed Treatment:   40   mins   Total Treatment:     40   mins      Electronically signed:   Machelle Kevin PTA  Physical Therapist Assistant  [facility] License #: [id number]

## 2024-08-21 ENCOUNTER — TREATMENT (OUTPATIENT)
Dept: PHYSICAL THERAPY | Facility: CLINIC | Age: 19
End: 2024-08-21
Payer: OTHER GOVERNMENT

## 2024-08-21 DIAGNOSIS — R26.2 DIFFICULTY WALKING: ICD-10-CM

## 2024-08-21 DIAGNOSIS — S24.102D T5 SPINAL CORD INJURY, SUBSEQUENT ENCOUNTER: Primary | ICD-10-CM

## 2024-08-21 DIAGNOSIS — S06.9X2D TRAUMATIC BRAIN INJURY WITH LOSS OF CONSCIOUSNESS OF 31 MINUTES TO 59 MINUTES, SUBSEQUENT ENCOUNTER: ICD-10-CM

## 2024-08-21 NOTE — PROGRESS NOTES
Physical Therapy Daily Treatment Note                      [facility] [address] [facility] [place] [address]    Patient: Ehsan Woodson   : 2005  Diagnosis/ICD-10 Code:  T5 spinal cord injury, subsequent encounter [S24.102D]  Referring practitioner: Gary Cash MD  Date of Initial Visit: Type: THERAPY  Noted: 2024  Today's Date: 2024  Patient seen for 30 sessions           Subjective   The patient reported that she took a two hour nap after last session. They took her commode out of her room and told her to start walking to bathroom at home.    Objective   See Exercise, Manual, and Modality Logs for complete treatment.     Assessment/Plan  Pt tolerated today's session well. She continues to improve with activities in the harness. Pt's balance reactions are visibly improving. Her depth of squat in the harness is increasing, indicating improvements in control of the knee musculature. Pt obviously fatigued at end of session, as evidenced by increased clonus in LE's. Skilled therapy still required to increase independence. Continue Poc.        Timed:  Manual Therapy:    0     mins  07829;  Therapeutic Exercise:    0     mins  15246;     Neuromuscular Rachana:   23    mins  22222;    Therapeutic Activity:     30     mins  83213;     Gait Trainin     mins  14161;     Aquatics                         0      mins  37191    Un-timed:  Mechanical Traction      0     mins  81620  Dry Needling     0     mins self-pay  Electrical Stimulation:    0     mins  63438 ( );      Timed Treatment:   53   mins   Total Treatment:     53   mins    Mana Awad PT    Electronically signed 2024    [place] License: PT - [id number]

## 2024-08-23 ENCOUNTER — TREATMENT (OUTPATIENT)
Dept: PHYSICAL THERAPY | Facility: CLINIC | Age: 19
End: 2024-08-23
Payer: OTHER GOVERNMENT

## 2024-08-23 DIAGNOSIS — R26.2 DIFFICULTY WALKING: ICD-10-CM

## 2024-08-23 DIAGNOSIS — S06.9X2D TRAUMATIC BRAIN INJURY WITH LOSS OF CONSCIOUSNESS OF 31 MINUTES TO 59 MINUTES, SUBSEQUENT ENCOUNTER: ICD-10-CM

## 2024-08-23 DIAGNOSIS — S24.102D T5 SPINAL CORD INJURY, SUBSEQUENT ENCOUNTER: Primary | ICD-10-CM

## 2024-08-23 NOTE — PROGRESS NOTES
Physical Therapy Daily Treatment Note                      [facility] [address] [facility] [place] [address]    Patient: Ehsan Woodson   : 2005  Diagnosis/ICD-10 Code:  T5 spinal cord injury, subsequent encounter [S24.102D]  Referring practitioner: Gary Cash MD  Date of Initial Visit: Type: THERAPY  Noted: 2024  Today's Date: 2024  Patient seen for 31 sessions           Subjective   The patient reported that she took a nap for a few hours after her last session.     Objective   See Exercise, Manual, and Modality Logs for complete treatment.     Assessment/Plan  Pt tolerated today's session well. She continues to improve with activities in the harness. Pt able to progress ball toss from standing on ground to foam today with CGA from therapist. This indicates improvements in static balance and LE endurance. Pt's L hip flexor activation is improving, as evidenced by ability to lift foot to tap 1 inch step with L foot while in R SLS. Pt was very fatigued at the end of her session. Skilled therapy still required to increase independence. Continue Poc.          Timed:  Manual Therapy:    0     mins  52023;  Therapeutic Exercise:    0     mins  92311;     Neuromuscular Rachana:   25    mins  76636;    Therapeutic Activity:     15     mins  14913;     Gait Trainin     mins  02061;     Aquatics                         0      mins  50687    Un-timed:  Mechanical Traction      0     mins  41228  Dry Needling     0     mins self-pay  Electrical Stimulation:    0     mins  30968 ( );      Timed Treatment:   53   mins   Total Treatment:     53   mins    Mana Awad PT    Electronically signed 2024    [place] License: PT - [id number]

## 2024-08-27 ENCOUNTER — TREATMENT (OUTPATIENT)
Dept: PHYSICAL THERAPY | Facility: CLINIC | Age: 19
End: 2024-08-27
Payer: OTHER GOVERNMENT

## 2024-08-27 DIAGNOSIS — S06.9X2D TRAUMATIC BRAIN INJURY WITH LOSS OF CONSCIOUSNESS OF 31 MINUTES TO 59 MINUTES, SUBSEQUENT ENCOUNTER: ICD-10-CM

## 2024-08-27 DIAGNOSIS — S24.102D T5 SPINAL CORD INJURY, SUBSEQUENT ENCOUNTER: Primary | ICD-10-CM

## 2024-08-27 DIAGNOSIS — R26.2 DIFFICULTY WALKING: ICD-10-CM

## 2024-08-27 NOTE — PROGRESS NOTES
Re-Assessment / Re-Certification  [facility] [address] [place] [address]      Patient: Ehsan Woodson   : 2005  Diagnosis/ICD-10 Code:  T5 spinal cord injury, subsequent encounter [S24.102D]  Referring practitioner: Gary Cash MD  Date of Initial Visit: Type: THERAPY  Noted: 2024  Today's Date: 2024  Patient seen for 32 sessions      Subjective:   Subjective Questionnaire:    OPTIMAL: 3 activities you would like to be able to do w/o any difficulty (10,11,16).  Primary goal: #11  DIFFICULTY: 11 score  CONFIDENCE: 10 score      All Questions:   DIFFICULTY: 58  CONFIDENCE: 54    Clinical Progress: improved  Home Program Compliance: Yes  Treatment has included: therapeutic exercise, neuromuscular re-education, manual therapy, therapeutic activity, and gait training    Subjective     Pt reports that she went to sleep for a while after her last treatment session.     Objective     Left Hip   Planes of Motion   Flexion: 5     Right Hip   Planes of Motion   Flexion: 5     Left Knee   Flexion: 4-  Extension: 5     Right Knee   Flexion: 5  Extension: 5     Left Ankle/Foot   Dorsiflexion: 2-     Right Ankle/Foot   Dorsiflexion: 5     Testing:   Standing w/o AD: 3 minutes and 10 seconds SBA     See Exercise, Manual, and Modality Logs for complete treatment.     Assessment/Plan    Pt tolerated today's session well. Today was reassessment day. Pt has made great improvements since her last progress note. She has been able to transition from coming to therapy in her wheelchair to walking in with her rolling walker. Her strength measurements have slightly improved, but have remained relatively the same since the last progress note. Her L ankle DF is still weak, so Kazakh stim of the anterior tibialis and peroneus longus was incorporated today. Pt's struggled greatly with prone L hamstring activation due to continued overactivity and high tone of the L quadriceps. For this reason, Russian stim  of the L hamstrings was incorporated while the pt was in prone. This helped to achieve greater knee flexion. There was a very strong associated reaction of the R knee, so that was used to help facilitate L knee flexion, as well. Pt was able to meet one more of her standing goals today, indicating improvements in balance and endurance. Pt continues to have increased quad tone on the left, which makes advancement of the L LE during swing phase difficult. Clearance of the L foot during swing phase is made difficult by the following: L DF weakness, L hip flexor weakness and increased L quadriceps tone. Pt can ambulate with a walker safely with SBA now. PT will try to progress pt to walking with walking sticks while in the harness. PT may also try FES while treadmill training if made possible by the equipment available. Pt requires continued skilled PT services in order to address deficits limiting independence during ADLs, such as transfers, ambulating, indep standing, etc. Continue POC.         Goals  Plan Goals: 1. Mobility: Walking/Moving Around Functional Limitation                      LTG 1: 20 weeks: The patient will be able to ambulate 100 ft with rolling walker with min A in order to improve function and independence with ADLs.  STATUS: MET  STG 1: 12 weeks: The patient will be able to ambulate 15 ft with rolling walker with min A in order to improve function and independence with ADLs.  STATUS: MET  LTG 1b: 20 weeks: The patient will be able to ambulate 140 ft with walking sticks or Lofstrand crutches min A in order to improve function and independence with ADLs.  STATUS: progressing  STG 1b: 20 weeks: The patient will be able to ambulate 50 ft with walking sticks or Lofstrand crutches min A in order to improve function and independence with ADLs.  STATUS: progressing     2. Mobility: Walking/Moving Around Functional Limitation                      LTG 2: 12 weeks: The patient will be able to stand  independently for 4 minutes with rolling walker to improve function during ADLs such as cooking, grooming her hair and other activities.  STATUS: MET  STG 2: 6 weeks: The patient will be able to stand with CGA with rolling walker for 2 minutes to improve function during ADLs such as cooking, grooming her hair and other activities.  STATUS: MET  LTG 2b: 12 weeks: The patient will be able to stand independently for 4 minutes in order to improve function during ADLs such as cooking, grooming her hair and other activities.  STATUS: progressing  STG 2b: 6 weeks: The patient will be able to stand indep for 2 minutes to improve function during ADLs such as cooking, grooming her hair and other activities.  STATUS: MET     3. Mobility: Walking/Moving Around Functional Limitation                      LTG 2: 12 weeks: The patient will be able to complete sit to/from stand transfers independently.  STATUS: MET  STG 2: 6 weeks: The patient will be able to complete sit to/from stand transfers with CGA.  STATUS: MET     4. Mobility: Walking/Moving Around Functional Limitation                   LTG 4: 20 weeks: The patient will demonstrate improved function by scoring a 55 on the Optimal.  STATUS: progressing  STG 4: 6 weeks: The patient will demonstrate improved function by scoring a 70 on the Optimal.  STATUS: progressing    Progress toward previous goals: Partially Met      Recommendations: Continue as planned  Timeframe: 3 months  Prognosis to achieve goals: good    Signature: Mana Awad PT    Electronically signed 8/27/2024    [place] License: PT - [id number]      Based upon review of the patient's progress and continued therapy plan, it is my medical opinion that Ehsan Woodson should continue physical therapy treatment at [facility] PHYSICAL THERAPY  [address] [facility] [place] [address] [phone].    Signature: __________________________________  Gary Cash MD    90 Day  Recertification  Certification Period: 2024 thru 2024  I certify that the therapy services are furnished while this patient is under my care.  The services outlined above are required by this patient, and will be reviewed every 90 days.     PHYSICIAN: Gary Cash MD  NPI: [id number]      DATE:     Please sign and return via fax to [phone]. Thank you, [facility].    Timed:  Manual Therapy:    0     mins  04075;  Therapeutic Exercise:    0     mins  44500;     Neuromuscular Rachana:    0    mins  64151;    Therapeutic Activity:     35     mins  79931;     Gait Trainin     mins  88570;     Aquatics                         0      mins  91257    Un-timed:  Mechanical Traction      0     mins  46379  Dry Needling     0     mins self-pay  Electrical Stimulation:    20    mins  58407 ( );    Timed Treatment:   55   mins   Total Treatment:     55   mins

## 2024-08-28 ENCOUNTER — TREATMENT (OUTPATIENT)
Dept: PHYSICAL THERAPY | Facility: CLINIC | Age: 19
End: 2024-08-28
Payer: OTHER GOVERNMENT

## 2024-08-28 DIAGNOSIS — S06.9X2D TRAUMATIC BRAIN INJURY WITH LOSS OF CONSCIOUSNESS OF 31 MINUTES TO 59 MINUTES, SUBSEQUENT ENCOUNTER: ICD-10-CM

## 2024-08-28 DIAGNOSIS — S24.102D T5 SPINAL CORD INJURY, SUBSEQUENT ENCOUNTER: Primary | ICD-10-CM

## 2024-08-28 DIAGNOSIS — R26.2 DIFFICULTY WALKING: ICD-10-CM

## 2024-08-28 NOTE — PROGRESS NOTES
Physical Therapy Daily Treatment Note                      [facility] [address] [facility] [place] [address]    Patient: Ehsan Woodson   : 2005  Diagnosis/ICD-10 Code:  T5 spinal cord injury, subsequent encounter [S24.102D]  Referring practitioner: Gary Cash MD  Date of Initial Visit: Type: THERAPY  Noted: 2024  Today's Date: 2024  Patient seen for 33 sessions           Subjective   The patient reported that she is good today.     Objective   See Exercise, Manual, and Modality Logs for complete treatment.     Assessment/Plan  Upon entering clinic today, pt seemed to have some carryover from her last treatment because she was clearing her L LE better during the swing phase of her gait. Pt tolerated today's session well. Therapist implemented Burundian estim (used more like a functional electrical stimulation) while standing & marching in order to help with activation and clearance of the L LE. Pt did well with this during  and showed good clearance of the L foot. However, pt's L LE fatigued fairly quickly, and she started to have more difficulty lifting the L LE off the floor than she did at the beginning of the session. Pt continues to improve steadily, but still struggles with L increased quadriceps tone, which makes functional movements difficult. Skilled therapy still required in order to increase independence. Continue POC.       Timed:  Manual Therapy:    0     mins  86786;  Therapeutic Exercise:    0     mins  44121;     Neuromuscular Rachana:   0    mins  24998;    Therapeutic Activity:     45     mins  01000;     Gait Trainin     mins  08396;     Aquatics                         0      mins  52078    Un-timed:  Mechanical Traction      0     mins  10943  Dry Needling     0     mins self-pay  Electrical Stimulation:    0     mins  28900 ( );      Timed Treatment:   45   mins   Total Treatment:     45   mins    Mana Awad PT    Electronically signed  8/28/2024    [place] License: PT - [id number]

## 2024-08-29 ENCOUNTER — OFFICE VISIT (OUTPATIENT)
Dept: BEHAVIORAL HEALTH | Facility: CLINIC | Age: 19
End: 2024-08-29
Payer: OTHER GOVERNMENT

## 2024-08-29 VITALS
DIASTOLIC BLOOD PRESSURE: 62 MMHG | SYSTOLIC BLOOD PRESSURE: 108 MMHG | WEIGHT: 153 LBS | HEIGHT: 69 IN | BODY MASS INDEX: 22.66 KG/M2

## 2024-08-29 DIAGNOSIS — Z13.31 ENCOUNTER FOR SCREENING FOR DEPRESSION: ICD-10-CM
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
## 2024-08-30 ENCOUNTER — TREATMENT (OUTPATIENT)
Dept: PHYSICAL THERAPY | Facility: CLINIC | Age: 19
End: 2024-08-30
Payer: OTHER GOVERNMENT

## 2024-08-30 DIAGNOSIS — S24.102D T5 SPINAL CORD INJURY, SUBSEQUENT ENCOUNTER: Primary | ICD-10-CM

## 2024-08-30 DIAGNOSIS — S06.9X2D TRAUMATIC BRAIN INJURY WITH LOSS OF CONSCIOUSNESS OF 31 MINUTES TO 59 MINUTES, SUBSEQUENT ENCOUNTER: ICD-10-CM

## 2024-08-30 DIAGNOSIS — R26.2 DIFFICULTY WALKING: ICD-10-CM

## 2024-08-30 NOTE — PROGRESS NOTES
Physical Therapy Daily Treatment Note                      [facility] [address] [place] [address]    Patient: Ehsan Woodson   : 2005  Diagnosis/ICD-10 Code:  T5 spinal cord injury, subsequent encounter [S24.102D]  Referring practitioner: Gary Cash MD  Date of Initial Visit: Type: THERAPY  Noted: 2024  Today's Date: 2024  Patient seen for 34 sessions           Subjective   The patient reported that she was tired yesterday. Pt reports that she used half kneeling to get into her bed from the floor the other day.     Objective   See Exercise, Manual, and Modality Logs for complete treatment.     Assessment/Plan  Pt tolerated today's session well. Pt continues to progress very well with balance, gait and endurance. Pt was able to obtain tall kneeling and maintain it by herself today while throwing balls, which is a very significant improvement compared to previous sessions. At previous sessions, she required Mod Tactile Assist from therapist in order to get into and maintain tall kneeling, so the fact that she was able to do it indep today surprised and pleased the therapist very much. This indicates that pt's glute and hamstring strength is improving. There is potential that the Tongan stimulation to the hamstrings from the previous two sessions helped this along. Pt's balance and gait are still not steady enough to allow her to walk with walking sticks or lofstrand crutches. Pt is improving while in the harness, but still has several episodes per PT session where the harness has to catch her. Skilled therapy still required to increase independence. Continue Poc.        Timed:  Manual Therapy:    0     mins  52505;  Therapeutic Exercise:    0     mins  23203;     Neuromuscular Rachana:   0    mins  46136;    Therapeutic Activity:     32     mins  05985;     Gait Trainin     mins  01149;     Aquatics                         0      mins  19920    Un-timed:  Mechanical  Traction      0     mins  60386  Dry Needling     0     mins self-pay  Electrical Stimulation:    0     mins  01267 ( );      Timed Treatment:   55   mins   Total Treatment:     55   mins    Mana Awad PT    Electronically signed 8/30/2024    [place] License: PT - [id number]

## 2024-09-03 ENCOUNTER — TREATMENT (OUTPATIENT)
Dept: PHYSICAL THERAPY | Facility: CLINIC | Age: 19
End: 2024-09-03
Payer: OTHER GOVERNMENT

## 2024-09-03 DIAGNOSIS — R26.2 DIFFICULTY WALKING: ICD-10-CM

## 2024-09-03 DIAGNOSIS — S06.9X2D TRAUMATIC BRAIN INJURY WITH LOSS OF CONSCIOUSNESS OF 31 MINUTES TO 59 MINUTES, SUBSEQUENT ENCOUNTER: ICD-10-CM

## 2024-09-03 DIAGNOSIS — S24.102D T5 SPINAL CORD INJURY, SUBSEQUENT ENCOUNTER: Primary | ICD-10-CM

## 2024-09-03 NOTE — PROGRESS NOTES
Physical Therapy Daily Treatment Note                      [facility] [address] [facility] [place] [address]    Patient: Ehsan Woodson   : 2005  Diagnosis/ICD-10 Code:  T5 spinal cord injury, subsequent encounter [S24.102D]  Referring practitioner: Gary Cash MD  Date of Initial Visit: Type: THERAPY  Noted: 2024  Today's Date: 9/3/2024  Patient seen for 35 sessions           Subjective   The patient reported that she is good today.     Objective   See Exercise, Manual, and Modality Logs for complete treatment.     Assessment/Plan  Pt tolerated today's session well. Pt continues to progress very well with balance, gait and endurance. Pt has improved greatly with L hip flexion for marching. Pt's mother verbalized that, all the sudden, the pt was able to lift up her L leg the other day. Russian stim to the hamstring and dorsiflexors may have helped solidify the neural pathways. Pt is now able to step up onto the 4-inch step with the L LE, whereas she was only able to step up with the R LE before today. Pt's balance and gait are still not steady enough to allow her to walk with walking sticks or lofstrand crutches. Pt is improving while in the harness, but still has several episodes per PT session where the harness has to catch her. Skilled therapy still required to increase independence. Continue Poc.          Timed:  Manual Therapy:    0     mins  55508;  Therapeutic Exercise:    0     mins  68207;     Neuromuscular Rachana:   10    mins  70514;    Therapeutic Activity:     23     mins  48852;     Gait Trainin     mins  06123;     Aquatics                         0      mins  64326    Un-timed:  Mechanical Traction      0     mins  32345  Dry Needling     0     mins self-pay  Electrical Stimulation:    0     mins  04125 ( );      Timed Treatment:   42   mins   Total Treatment:     42   mins    Mana Awad PT    Electronically signed 9/3/2024    [place] License: PT -  [id number]

## 2024-09-05 ENCOUNTER — TREATMENT (OUTPATIENT)
Dept: PHYSICAL THERAPY | Facility: CLINIC | Age: 19
End: 2024-09-05
Payer: OTHER GOVERNMENT

## 2024-09-05 DIAGNOSIS — S06.9X2D TRAUMATIC BRAIN INJURY WITH LOSS OF CONSCIOUSNESS OF 31 MINUTES TO 59 MINUTES, SUBSEQUENT ENCOUNTER: ICD-10-CM

## 2024-09-05 DIAGNOSIS — S24.102D T5 SPINAL CORD INJURY, SUBSEQUENT ENCOUNTER: Primary | ICD-10-CM

## 2024-09-05 DIAGNOSIS — R26.2 DIFFICULTY WALKING: ICD-10-CM

## 2024-09-05 NOTE — PROGRESS NOTES
Physical Therapy Daily Treatment Note                      [facility] [address] [place] [address]    Patient: Ehsan Woodson   : 2005  Diagnosis/ICD-10 Code:  T5 spinal cord injury, subsequent encounter [S24.102D]  Referring practitioner: Gary Cash MD  Date of Initial Visit: Type: THERAPY  Noted: 2024  Today's Date: 2024  Patient seen for 36 sessions           Subjective   The patient reported no new complaints today.     Objective   See Exercise, Manual, and Modality Logs for complete treatment.     Assessment/Plan  Pt tolerated today's session well. Pt continues to progress very well with balance, gait and endurance. Pt able to maintain stepping up onto a 4-inch step with the L LE today. She was even able to step up onto a 6-inch step today, with both right and left LE's.   This is a significant improvement compared to just a couple of weeks ago. However, pt does exhibit significant circumduction during this activity. This should improve over time as pt's hip flexion and knee flexion continue to improve. Pt was able to walk three steps in the harness without any assist from the PT today. However, pt lost her balance after these steps, and the harness had to catch her. Pt is improving over all, but still has to be in the harness for the majority of her exercises/activities due to impaired balance and gait. Skilled therapy still required to increase independence and decrease falls risk. Continue Poc.        Timed:  Manual Therapy:    0     mins  07057;  Therapeutic Exercise:    0     mins  34734;     Neuromuscular Rachana:   22    mins  86094;    Therapeutic Activity:     10     mins  82414;     Gait Trainin     mins  41037;     Aquatics                         0      mins  59018    Un-timed:  Mechanical Traction      0     mins  35905  Dry Needling     0     mins self-pay  Electrical Stimulation:    0     mins  60453 ( );      Timed Treatment:   55 mins   Total  Treatment:     55   mins    Mana Awad PT    Electronically signed 9/5/2024    [place] License: PT - [id number]

## 2024-09-09 ENCOUNTER — TREATMENT (OUTPATIENT)
Dept: PHYSICAL THERAPY | Facility: CLINIC | Age: 19
End: 2024-09-09
Payer: OTHER GOVERNMENT

## 2024-09-09 DIAGNOSIS — S24.102D T5 SPINAL CORD INJURY, SUBSEQUENT ENCOUNTER: Primary | ICD-10-CM

## 2024-09-09 DIAGNOSIS — R26.2 DIFFICULTY WALKING: ICD-10-CM

## 2024-09-09 DIAGNOSIS — S06.9X2D TRAUMATIC BRAIN INJURY WITH LOSS OF CONSCIOUSNESS OF 31 MINUTES TO 59 MINUTES, SUBSEQUENT ENCOUNTER: ICD-10-CM

## 2024-09-09 PROCEDURE — 97530 THERAPEUTIC ACTIVITIES: CPT | Performed by: PHYSICAL THERAPIST

## 2024-09-09 PROCEDURE — 97110 THERAPEUTIC EXERCISES: CPT | Performed by: PHYSICAL THERAPIST

## 2024-09-09 PROCEDURE — 97112 NEUROMUSCULAR REEDUCATION: CPT | Performed by: PHYSICAL THERAPIST

## 2024-09-09 NOTE — PROGRESS NOTES
Outpatient Physical Therapy  [address] [facility], [place] [address]                            Physical Therapy Daily Treatment Note    Patient: Ehsan Woodson   : 2005  Diagnosis/ICD-10 Code:  T5 spinal cord injury, subsequent encounter [S24.102D]  Referring practitioner: Gary Cash MD  Date of Initial Visit: Type: THERAPY  Noted: 2024  Today's Date: 2024  Patient seen for 37 sessions           Subjective   Ehsan Woodson reports: that she has been walking more, notices that she is able to  her left leg more as she is walking.    Objective   General fatigue.    See Exercise, Manual, and Modality Logs for complete treatment.     Assessment/Plan  Ehsan progressing as evident by decreased falls and increased tolerance of PT session. Pt required  SOLO Step  throughout PT session, especially with balance and gait training. Pt would benefit from skilled PT to address strength and endurance deficits, transfer and gait training and any concerns with ADLs.       Progress per Plan of Care         Timed:  Manual Therapy:        mins  72999;  Therapeutic Exercise:    15     mins  20095;     Neuromuscular Rachana:    10    mins  87424;    Therapeutic Activity:     15     mins  39951;     Gait Training:           mins  31981;        Timed Treatment:   40   mins   Total Treatment:     40   mins      Electronically signed:   Machelle Kevin PTA  Physical Therapist Assistant  [facility] License #: [id number]

## 2024-09-10 DIAGNOSIS — Z13.31 SCREENING FOR DEPRESSION: Primary | ICD-10-CM

## 2024-09-11 ENCOUNTER — TREATMENT (OUTPATIENT)
Dept: PHYSICAL THERAPY | Facility: CLINIC | Age: 19
End: 2024-09-11
Payer: OTHER GOVERNMENT

## 2024-09-11 DIAGNOSIS — S24.102D T5 SPINAL CORD INJURY, SUBSEQUENT ENCOUNTER: Primary | ICD-10-CM

## 2024-09-11 DIAGNOSIS — S06.9X2D TRAUMATIC BRAIN INJURY WITH LOSS OF CONSCIOUSNESS OF 31 MINUTES TO 59 MINUTES, SUBSEQUENT ENCOUNTER: ICD-10-CM

## 2024-09-11 DIAGNOSIS — R26.2 DIFFICULTY WALKING: ICD-10-CM

## 2024-09-11 NOTE — PROGRESS NOTES
Physical Therapy Daily Treatment Note                      [facility] [address] [place] [address]    Patient: Ehsan Woodson   : 2005  Diagnosis/ICD-10 Code:  T5 spinal cord injury, subsequent encounter [S24.102D]  Referring practitioner: Gary Cash MD  Date of Initial Visit: Type: THERAPY  Noted: 2024  Today's Date: 2024  Patient seen for 38 sessions           Subjective   The patient reported that she is not sore today. Pt and her mother report that pt has been trying to walk at home without an AD with her mother present.     Objective   See Exercise, Manual, and Modality Logs for complete treatment.     Assessment/Plan  Pt tolerated today's session well. Peruvian estim was performed on the B gluteus cathleen muscles, in order to facilitate more hip extension power for half kneeling, standing and ambulation. During Bhutanese estim, pt was able to lift both thighs off the table, but she was able to get more hip extension on the L side. Therapist continues to think that her SCI caused weakness of the glutes of the R LE and weakness of the hip flexors & dorsiflexors of the L LE. Pt was able to perform STS from a low chair, plus step ups in the parallel bars today, even without the harness. Also in the parallel bars, pt was able to perform lateral eccentric heel taps to work on quadriceps strength. Skilled therapy still required to increase independence and decrease risk for falls. Continue Poc.        Timed:  Manual Therapy:    0     mins  83734;  Therapeutic Exercise:    0     mins  33355;     Neuromuscular Rachana:   25    mins  36989;    Therapeutic Activity:     30     mins  69852;     Gait Trainin     mins  71674;     Aquatics                         0      mins  46276    Un-timed:  Mechanical Traction      0     mins  55310  Dry Needling     0     mins self-pay  Electrical Stimulation:    0     mins  83853 ( );      Timed Treatment:   55   mins   Total  Treatment:     55   mins    Mana Awad PT    Electronically signed 9/11/2024    [place] License: PT - [id number]

## 2024-09-13 ENCOUNTER — TREATMENT (OUTPATIENT)
Dept: PHYSICAL THERAPY | Facility: CLINIC | Age: 19
End: 2024-09-13
Payer: OTHER GOVERNMENT

## 2024-09-13 DIAGNOSIS — S24.102D T5 SPINAL CORD INJURY, SUBSEQUENT ENCOUNTER: Primary | ICD-10-CM

## 2024-09-13 DIAGNOSIS — R26.2 DIFFICULTY WALKING: ICD-10-CM

## 2024-09-13 DIAGNOSIS — S06.9X2D TRAUMATIC BRAIN INJURY WITH LOSS OF CONSCIOUSNESS OF 31 MINUTES TO 59 MINUTES, SUBSEQUENT ENCOUNTER: ICD-10-CM

## 2024-09-13 NOTE — PROGRESS NOTES
Physical Therapy Daily Treatment Note                      [place] [facility] [address] [facility] [place] [address]    Patient: Ehsan Woodson   : 2005  Diagnosis/ICD-10 Code:  T5 spinal cord injury, subsequent encounter [S24.102D]  Referring practitioner: Gary Cash MD  Date of Initial Visit: Type: THERAPY  Noted: 2024  Today's Date: 2024  Patient seen for 39 sessions           Subjective   The patient reported no new complaints today.     Objective   See Exercise, Manual, and Modality Logs for complete treatment.     Assessment/Plan  Pt tolerated today's session well. Pt was able to try ambulating with the trekking poles while in the harness today, which she did very well with, even without CGA from there therapist. The trekking poles help her perform reciprocal arm swing, as well. Pt tries two new activities today (slider side lunges and rocker board). It is evident that her balance is improving. However, she still has episodes of the knees giving way due to quadriceps weakness, especially when she is fatigued. Skilled therapy still required in order to decrease falls risk and increase her independence. Continue POC.        Timed:  Manual Therapy:    0     mins  98731;  Therapeutic Exercise:    0     mins  30311;     Neuromuscular Rachana:   23    mins  45892;    Therapeutic Activity:     16     mins  83182;     Gait Trainin     mins  12960;     Aquatics                         0      mins  50607    Un-timed:  Mechanical Traction      0     mins  93276  Dry Needling     0     mins self-pay  Electrical Stimulation:    0     mins  95286 ( );      Timed Treatment:   57   mins   Total Treatment:     57   mins    Mana Awad PT    Electronically signed 2024    [place] License: PT - [id number]

## 2024-09-16 ENCOUNTER — TREATMENT (OUTPATIENT)
Dept: PHYSICAL THERAPY | Facility: CLINIC | Age: 19
End: 2024-09-16
Payer: OTHER GOVERNMENT

## 2024-09-16 DIAGNOSIS — R26.2 DIFFICULTY WALKING: ICD-10-CM

## 2024-09-16 DIAGNOSIS — S24.102D T5 SPINAL CORD INJURY, SUBSEQUENT ENCOUNTER: Primary | ICD-10-CM

## 2024-09-16 DIAGNOSIS — S06.9X2D TRAUMATIC BRAIN INJURY WITH LOSS OF CONSCIOUSNESS OF 31 MINUTES TO 59 MINUTES, SUBSEQUENT ENCOUNTER: ICD-10-CM

## 2024-09-16 PROCEDURE — 97530 THERAPEUTIC ACTIVITIES: CPT | Performed by: PHYSICAL THERAPIST

## 2024-09-18 ENCOUNTER — TREATMENT (OUTPATIENT)
Dept: PHYSICAL THERAPY | Facility: CLINIC | Age: 19
End: 2024-09-18
Payer: OTHER GOVERNMENT

## 2024-09-18 DIAGNOSIS — R26.2 DIFFICULTY WALKING: ICD-10-CM

## 2024-09-18 DIAGNOSIS — S24.102D T5 SPINAL CORD INJURY, SUBSEQUENT ENCOUNTER: Primary | ICD-10-CM

## 2024-09-18 DIAGNOSIS — S06.9X2D TRAUMATIC BRAIN INJURY WITH LOSS OF CONSCIOUSNESS OF 31 MINUTES TO 59 MINUTES, SUBSEQUENT ENCOUNTER: ICD-10-CM

## 2024-09-19 ENCOUNTER — OFFICE VISIT (OUTPATIENT)
Dept: BEHAVIORAL HEALTH | Facility: CLINIC | Age: 19
End: 2024-09-19
Payer: OTHER GOVERNMENT
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
## 2024-09-20 ENCOUNTER — TREATMENT (OUTPATIENT)
Dept: PHYSICAL THERAPY | Facility: CLINIC | Age: 19
End: 2024-09-20
Payer: OTHER GOVERNMENT

## 2024-09-20 DIAGNOSIS — S24.102D T5 SPINAL CORD INJURY, SUBSEQUENT ENCOUNTER: Primary | ICD-10-CM

## 2024-09-20 DIAGNOSIS — R26.2 DIFFICULTY WALKING: ICD-10-CM

## 2024-09-20 DIAGNOSIS — S06.9X2D TRAUMATIC BRAIN INJURY WITH LOSS OF CONSCIOUSNESS OF 31 MINUTES TO 59 MINUTES, SUBSEQUENT ENCOUNTER: ICD-10-CM

## 2024-09-23 ENCOUNTER — TREATMENT (OUTPATIENT)
Dept: PHYSICAL THERAPY | Facility: CLINIC | Age: 19
End: 2024-09-23
Payer: OTHER GOVERNMENT

## 2024-09-23 DIAGNOSIS — R26.2 DIFFICULTY WALKING: ICD-10-CM

## 2024-09-23 DIAGNOSIS — S06.9X2D TRAUMATIC BRAIN INJURY WITH LOSS OF CONSCIOUSNESS OF 31 MINUTES TO 59 MINUTES, SUBSEQUENT ENCOUNTER: ICD-10-CM

## 2024-09-23 DIAGNOSIS — S24.102D T5 SPINAL CORD INJURY, SUBSEQUENT ENCOUNTER: Primary | ICD-10-CM

## 2024-09-23 PROCEDURE — 97530 THERAPEUTIC ACTIVITIES: CPT | Performed by: PHYSICAL THERAPIST

## 2024-09-23 PROCEDURE — 97112 NEUROMUSCULAR REEDUCATION: CPT | Performed by: PHYSICAL THERAPIST

## 2024-09-23 PROCEDURE — 97140 MANUAL THERAPY 1/> REGIONS: CPT | Performed by: PHYSICAL THERAPIST

## 2024-09-25 ENCOUNTER — TREATMENT (OUTPATIENT)
Dept: PHYSICAL THERAPY | Facility: CLINIC | Age: 19
End: 2024-09-25
Payer: OTHER GOVERNMENT

## 2024-09-25 DIAGNOSIS — R26.2 DIFFICULTY WALKING: ICD-10-CM

## 2024-09-25 DIAGNOSIS — S24.102D T5 SPINAL CORD INJURY, SUBSEQUENT ENCOUNTER: Primary | ICD-10-CM

## 2024-09-25 DIAGNOSIS — S06.9X2D TRAUMATIC BRAIN INJURY WITH LOSS OF CONSCIOUSNESS OF 31 MINUTES TO 59 MINUTES, SUBSEQUENT ENCOUNTER: ICD-10-CM

## 2024-09-30 ENCOUNTER — TREATMENT (OUTPATIENT)
Dept: PHYSICAL THERAPY | Facility: CLINIC | Age: 19
End: 2024-09-30
Payer: OTHER GOVERNMENT

## 2024-09-30 DIAGNOSIS — R26.2 DIFFICULTY WALKING: ICD-10-CM

## 2024-09-30 DIAGNOSIS — S06.9X2D TRAUMATIC BRAIN INJURY WITH LOSS OF CONSCIOUSNESS OF 31 MINUTES TO 59 MINUTES, SUBSEQUENT ENCOUNTER: ICD-10-CM

## 2024-09-30 DIAGNOSIS — S24.102D T5 SPINAL CORD INJURY, SUBSEQUENT ENCOUNTER: Primary | ICD-10-CM

## 2024-09-30 PROCEDURE — 97530 THERAPEUTIC ACTIVITIES: CPT | Performed by: PHYSICAL THERAPIST

## 2024-09-30 PROCEDURE — 97112 NEUROMUSCULAR REEDUCATION: CPT | Performed by: PHYSICAL THERAPIST

## 2024-09-30 PROCEDURE — 97110 THERAPEUTIC EXERCISES: CPT | Performed by: PHYSICAL THERAPIST

## 2024-09-30 NOTE — PROGRESS NOTES
"Physical Therapy Daily Treatment Note  [place] [facility] [address] [place] [address]    Patient: Ehsan Woodson   : 2005  Referring practitioner: Gary Cash MD  Date of Initial Visit: Type: THERAPY  Noted: 2024  Today's Date: 2024  Patient seen for 45 sessions           Subjective  Ehsan Woodson reports:  she slept going to [place] and back, \"I sleep in the car.\"    Objective   See Exercise, Manual, and Modality Logs for complete treatment.     Assessment/Plan  Fatigue obvious as session proceeded. Ehsan commented her quads were not cooperating with her today. Obvious muscle tremors B. Continued need for skilled care to facilitate strength, and functional ability.     Visit Diagnoses:    ICD-10-CM ICD-9-CM   1. T5 spinal cord injury, subsequent encounter  S24.102D V58.89     952.10   2. Difficulty walking  R26.2 719.7   3. 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, subsequent encounter  S06.9X2D V58.89     854.02       Progress per Plan of Care and Progress strengthening /stabilization /functional activity       Timed:  Manual Therapy:         mins  97753;  Therapeutic Exercise:    12     mins  02149;     Neuromuscular Rachana:    12    mins  63899;    Therapeutic Activity:     15     mins  61073;     Gait Training:           mins  22341;     Ultrasound:          mins  23083;    Electrical Stimulation:         mins  20107 ( );  Aquatics  __   mins   25133    Untimed:  Electrical Stimulation:         mins  52568 ( );  Mechanical Traction:         mins  07736;     Timed Treatment:   39   mins   Total Treatment:     39   mins    Electronically Signed:  Pauline Johnson PTA  Physical Therapist Assistant    [place] PTA license [id number]            "

## 2024-10-02 ENCOUNTER — TREATMENT (OUTPATIENT)
Dept: PHYSICAL THERAPY | Facility: CLINIC | Age: 19
End: 2024-10-02
Payer: OTHER GOVERNMENT

## 2024-10-02 DIAGNOSIS — R26.2 DIFFICULTY WALKING: ICD-10-CM

## 2024-10-02 DIAGNOSIS — S24.102D T5 SPINAL CORD INJURY, SUBSEQUENT ENCOUNTER: Primary | ICD-10-CM

## 2024-10-02 DIAGNOSIS — S06.9X2D TRAUMATIC BRAIN INJURY WITH LOSS OF CONSCIOUSNESS OF 31 MINUTES TO 59 MINUTES, SUBSEQUENT ENCOUNTER: ICD-10-CM

## 2024-10-02 NOTE — PROGRESS NOTES
Physical Therapy Daily Treatment Note                      [place] [facility] [address] [place] [address]    Patient: Ehsan Woodson   : 2005  Diagnosis/ICD-10 Code:  T5 spinal cord injury, subsequent encounter [S24.102D]  Referring practitioner: Gary Cash MD  Date of Initial Visit: Type: THERAPY  Noted: 2024  Today's Date: 10/2/2024  Patient seen for 46 sessions           Subjective   The patient reported no new complaints today.     Objective   See Exercise, Manual, and Modality Logs for complete treatment.     Assessment/Plan  Pt tolerated today's session well. Pt performed gait training on the treadmill today. Pt exhibited vaulting of the R LE during L swing phase when walking forward. Pt was able to partially correct this after min verbal and tactile cueing. Pt also required mod verbal and min tactile cueing to extend the L knee when in stance phase of gait. When ambulating backwards, pt had difficulty with L knee flexion during swing phase, so had to compensate by circumducting the L LE. These difficulties continue to be due to weak left hip flexors, overpowering L quadriceps and weak L dorsiflexors. Therapist printed out pictures of potential AFO's that the pt could try in order to assist with her weak left dorsiflexors to prevent toe drag during gait. Skilled therapy still required in order to decrease falls risk and increase her independence. Continue POC.        Timed:  Manual Therapy:    0     mins  00895;  Therapeutic Exercise:    0     mins  88658;     Neuromuscular Rachana:   0    mins  53952;    Therapeutic Activity:     8     mins  75783;     Gait Trainin     mins  02018;     Aquatics                         0      mins  79249    Un-timed:  Mechanical Traction      0     mins  48630  Dry Needling     0     mins self-pay  Electrical Stimulation:    0     mins  57299 ( );      Timed Treatment:   54   mins   Total Treatment:     54   mins    Mana Awad  PT    Electronically signed 10/2/2024    [place] License: PT - [id number]

## 2024-10-04 ENCOUNTER — TREATMENT (OUTPATIENT)
Dept: PHYSICAL THERAPY | Facility: CLINIC | Age: 19
End: 2024-10-04
Payer: OTHER GOVERNMENT

## 2024-10-04 DIAGNOSIS — S06.9X2D TRAUMATIC BRAIN INJURY WITH LOSS OF CONSCIOUSNESS OF 31 MINUTES TO 59 MINUTES, SUBSEQUENT ENCOUNTER: ICD-10-CM

## 2024-10-04 DIAGNOSIS — S24.102D T5 SPINAL CORD INJURY, SUBSEQUENT ENCOUNTER: Primary | ICD-10-CM

## 2024-10-04 DIAGNOSIS — R26.2 DIFFICULTY WALKING: ICD-10-CM

## 2024-10-04 NOTE — PROGRESS NOTES
Physical Therapy Daily Treatment Note                      [place] [facility] [address] [place] [address]    Patient: Ehsan Woodson   : 2005  Diagnosis/ICD-10 Code:  T5 spinal cord injury, subsequent encounter [S24.102D]  Referring practitioner: Gary Cash MD  Date of Initial Visit: Type: THERAPY  Noted: 2024  Today's Date: 10/4/2024  Patient seen for 47 sessions           Subjective   The patient reported no new complaints today. 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    Objective   See Exercise, Manual, and Modality Logs for complete treatment.     Assessment/Plan  Pt tolerated today's session well. She was able to walk outside with the [employer] poles for the first time today (with CGA from the therapist). This is the first time she has walked outside since her accident, which shows improvements in balance and community ambulation. Therapist was able to implement slip-board training today in an attempt to improve pt's reactive balance so that she can more easily recover from stumbling. This showed carry-over when pt was walking in the harness without an AD and was able to recover from a stumble. This was the first time that therapist has seen pt independently recover her balance. Pt was even able to perform a turn today indep while in the harness. Pt is showing very good improvements in balance, gait, endurance and coordination. Skilled therapy still required in order to decrease falls risk and increase her independence. Continue POC.        Timed:  Manual Therapy:    0     mins  67231;  Therapeutic Exercise:    0     mins  82512;     Neuromuscular Rachana:   10    mins  83225;    Therapeutic Activity:     21     mins  57536;     Gait Trainin     mins  81111;     Aquatics                         0      mins  25089    Un-timed:  Mechanical Traction      0     mins  62539  Dry Needling     0     mins  self-pay  Electrical Stimulation:    0     mins  46026 ( );      Timed Treatment:   54   mins   Total Treatment:     54   mins    Mana Awad PT    Electronically signed 10/4/2024    [place] License: PT - [id number]

## 2024-10-07 ENCOUNTER — TREATMENT (OUTPATIENT)
Dept: PHYSICAL THERAPY | Facility: CLINIC | Age: 19
End: 2024-10-07
Payer: OTHER GOVERNMENT

## 2024-10-07 DIAGNOSIS — S24.102D T5 SPINAL CORD INJURY, SUBSEQUENT ENCOUNTER: Primary | ICD-10-CM

## 2024-10-07 DIAGNOSIS — S06.9X2D TRAUMATIC BRAIN INJURY WITH LOSS OF CONSCIOUSNESS OF 31 MINUTES TO 59 MINUTES, SUBSEQUENT ENCOUNTER: ICD-10-CM

## 2024-10-07 DIAGNOSIS — R26.2 DIFFICULTY WALKING: ICD-10-CM

## 2024-10-07 PROCEDURE — 97112 NEUROMUSCULAR REEDUCATION: CPT

## 2024-10-07 PROCEDURE — 97110 THERAPEUTIC EXERCISES: CPT

## 2024-10-07 PROCEDURE — 97530 THERAPEUTIC ACTIVITIES: CPT

## 2024-10-07 NOTE — PROGRESS NOTES
Physical Therapy Daily Treatment Note  [place] [facility] [address] [place] [address]    Patient: Ehsan Woodson   : 2005  Referring practitioner: Gary Cash MD  Date of Initial Visit: Type: THERAPY  Noted: 2024  Today's Date: 10/7/2024  Patient seen for 48 sessions           Subjective  Ehsan Woodson reports:  to therapy with her mother. Both spoke about Ehsan and walked on the grass, no AD this weekend. Dante, her mother, showed video of this gait. She reported holding the gait belt for Ehsan. Dante reported Ehsan did lose her balance and went to the ground, using her RW to return to standing, with her mothers assistance.     Objective   See Exercise, Manual, and Modality Logs for complete treatment.     Assessment/Plan  Ehsan is showing gains in balance and her ability to recover from LOB. She did well with gait, no AD today. She did return to use of 1 walking stick as she fatigued. Continue Wednesday for further strengthening and balance activities.  Ehsan is constantly working at home.    Visit Diagnoses:    ICD-10-CM ICD-9-CM   1. T5 spinal cord injury, subsequent encounter  S24.102D V58.89     952.10   2. Difficulty walking  R26.2 719.7   3. 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, subsequent encounter  S06.9X2D V58.89     854.02       Progress per Plan of Care and Progress strengthening /stabilization /functional activity       Timed:  Manual Therapy:         mins  28622;  Therapeutic Exercise:  10       mins  85505;     Neuromuscular Rachana:  20      mins  97234;    Therapeutic Activity:   12       mins  18734;     Gait Training:          mins  73525;     Ultrasound:          mins  75834;    Electrical Stimulation:         mins  37678 ( );  Aquatics  __   mins   80216    Untimed:  Electrical Stimulation:         mins  05675 ( );  Mechanical Traction:         mins  65627;     Timed Treatment:   42   mins   Total  Treatment:     42   mins    Electronically Signed:  Pauline Johnson PTA  Physical Therapist Assistant    [place] PTA license [id number]

## 2024-10-09 ENCOUNTER — TREATMENT (OUTPATIENT)
Dept: PHYSICAL THERAPY | Facility: CLINIC | Age: 19
End: 2024-10-09
Payer: OTHER GOVERNMENT

## 2024-10-09 DIAGNOSIS — R26.2 DIFFICULTY WALKING: ICD-10-CM

## 2024-10-09 DIAGNOSIS — S24.102D T5 SPINAL CORD INJURY, SUBSEQUENT ENCOUNTER: Primary | ICD-10-CM

## 2024-10-09 DIAGNOSIS — S06.9X2D TRAUMATIC BRAIN INJURY WITH LOSS OF CONSCIOUSNESS OF 31 MINUTES TO 59 MINUTES, SUBSEQUENT ENCOUNTER: ICD-10-CM

## 2024-10-09 NOTE — PROGRESS NOTES
"   Physical Therapy Daily Treatment Note                      [place] [facility] [address] [facility] [place] [address]    Patient: Ehsan Woodson   : 2005  Diagnosis/ICD-10 Code:  T5 spinal cord injury, subsequent encounter [S24.102D]  Referring practitioner: Gary Cash MD  Date of Initial Visit: Type: THERAPY  Noted: 2024  Today's Date: 10/9/2024  Patient seen for 49 sessions           Subjective   The patient reported that she got her L ankle AFO.     Objective   See Exercise, Manual, and Modality Logs for complete treatment.     Assessment/Plan  Pt tolerated today's session well. She came into the clinic wearing the L ankle DF AFO that she recently bought off [employer]. This AFO seems to really help decrease her L toe drag during gait. With the AFO, indep gait was much improved and much more stable while she was in the harness. Pt still having difficulty with L hip flexion in R SLS, although this has improved overtime. Pt responded well to mod verbal cueing to \"push\" with her L LE while her L LE was on the 6-inch step and she was trying to step with with the R. Weakness of L hip flexors makes indep ambulation and step-ups/cone taps more difficult for her. Pt was able to try hopping for the first time in the harness today. Half the time, pt showed good eccentric control upon landing, but the other half of the time, her knees gave out on her while in the harness. Pt is progressing very well. Skilled therapy is still required in order to address impairments and increase level of independence. Continue POC.    Pt instructed to start sleeping with her DF brace (that she initially got in the hospital after her SCI) on the L leg at night so that her gastroc can get a stretch while she sleeps.          Timed:  Manual Therapy:    0     mins  07179;  Therapeutic Exercise:    0     mins  41119;     Neuromuscular Rachana:   10    mins  91424;    Therapeutic Activity:     22     mins  16905;     Gait Training:      " 23     mins  57597;     Aquatics                         0      mins  83579    Un-timed:  Mechanical Traction      0     mins  52591  Dry Needling     0     mins self-pay  Electrical Stimulation:    0     mins  30743 ( );      Timed Treatment:   55   mins   Total Treatment:     55   mins    Mana Awad PT    Electronically signed 10/9/2024    [place] License: PT - [id number]

## 2024-10-11 ENCOUNTER — TREATMENT (OUTPATIENT)
Dept: PHYSICAL THERAPY | Facility: CLINIC | Age: 19
End: 2024-10-11
Payer: OTHER GOVERNMENT

## 2024-10-11 DIAGNOSIS — S24.102D T5 SPINAL CORD INJURY, SUBSEQUENT ENCOUNTER: Primary | ICD-10-CM

## 2024-10-11 DIAGNOSIS — S06.9X2D TRAUMATIC BRAIN INJURY WITH LOSS OF CONSCIOUSNESS OF 31 MINUTES TO 59 MINUTES, SUBSEQUENT ENCOUNTER: ICD-10-CM

## 2024-10-11 DIAGNOSIS — R26.2 DIFFICULTY WALKING: ICD-10-CM

## 2024-10-11 NOTE — PROGRESS NOTES
Physical Therapy Daily Treatment Note                      [place] [facility] [address] [facility] [place] [address]    Patient: Ehsan Woodson   : 2005  Diagnosis/ICD-10 Code:  T5 spinal cord injury, subsequent encounter [S24.102D]  Referring practitioner: Gary Cash MD  Date of Initial Visit: Type: THERAPY  Noted: 2024  Today's Date: 10/11/2024  Patient seen for 50 sessions           Subjective   The patient reported that the L ankle AFO is still helping.    Objective   See Exercise, Manual, and Modality Logs for complete treatment.     Assessment/Plan  Pt tolerated today's session well. Pt performed gait training on the treadmill today. Pt still exhibiting slight vaulting of the R LE during L swing phase when walking forward. Pt improving with extending the L knee on initial contact when ambulating backwards. This indicates improved quad control on the L. AFO is significantly helping to pull pt's L ankle into DF, which is helping her gait mechanics. Pt required min verbal cueing today to straighten the L knee after planting the L foot during side steps. Skilled therapy still required in order to decrease falls risk and increase her independence. Continue POC.            Timed:  Manual Therapy:    0     mins  04458;  Therapeutic Exercise:    0     mins  45232;     Neuromuscular Rachana:   0    mins  27300;    Therapeutic Activity:     8     mins  42899;     Gait Trainin     mins  42518;     Aquatics                         0      mins  14200    Un-timed:  Mechanical Traction      0     mins  78005  Dry Needling     0     mins self-pay  Electrical Stimulation:    0     mins  88976 ( );      Timed Treatment:   40   mins   Total Treatment:     40   mins    Mana Awad PT    Electronically signed 10/11/2024    [place] License: PT - [id number]

## 2024-10-15 ENCOUNTER — TREATMENT (OUTPATIENT)
Dept: PHYSICAL THERAPY | Facility: CLINIC | Age: 19
End: 2024-10-15
Payer: OTHER GOVERNMENT

## 2024-10-15 ENCOUNTER — OFFICE VISIT (OUTPATIENT)
Dept: FAMILY MEDICINE CLINIC | Facility: CLINIC | Age: 19
End: 2024-10-15
Payer: OTHER GOVERNMENT

## 2024-10-15 VITALS
DIASTOLIC BLOOD PRESSURE: 78 MMHG | HEART RATE: 117 BPM | SYSTOLIC BLOOD PRESSURE: 110 MMHG | TEMPERATURE: 98.1 F | BODY MASS INDEX: 23.39 KG/M2 | HEIGHT: 69 IN | OXYGEN SATURATION: 97 % | WEIGHT: 157.9 LBS

## 2024-10-15 DIAGNOSIS — G62.9 NEUROPATHY: ICD-10-CM

## 2024-10-15 DIAGNOSIS — S06.9X2D TRAUMATIC BRAIN INJURY, WITH LOSS OF CONSCIOUSNESS OF 31 MINUTES TO 59 MINUTES, SUBSEQUENT ENCOUNTER: Primary | ICD-10-CM

## 2024-10-15 DIAGNOSIS — K59.00 CONSTIPATION, UNSPECIFIED CONSTIPATION TYPE: ICD-10-CM

## 2024-10-15 DIAGNOSIS — G47.00 INSOMNIA, UNSPECIFIED TYPE: ICD-10-CM

## 2024-10-15 DIAGNOSIS — R26.2 DIFFICULTY WALKING: ICD-10-CM

## 2024-10-15 DIAGNOSIS — S22.049K: ICD-10-CM

## 2024-10-15 DIAGNOSIS — S24.102D T5 SPINAL CORD INJURY, SUBSEQUENT ENCOUNTER: ICD-10-CM

## 2024-10-15 DIAGNOSIS — F41.9 ANXIETY: ICD-10-CM

## 2024-10-15 DIAGNOSIS — R00.0 TACHYCARDIA: ICD-10-CM

## 2024-10-15 DIAGNOSIS — S24.102D T5 SPINAL CORD INJURY, SUBSEQUENT ENCOUNTER: Primary | ICD-10-CM

## 2024-10-15 DIAGNOSIS — S06.9X2D TRAUMATIC BRAIN INJURY WITH LOSS OF CONSCIOUSNESS OF 31 MINUTES TO 59 MINUTES, SUBSEQUENT ENCOUNTER: ICD-10-CM

## 2024-10-15 PROCEDURE — 99214 OFFICE O/P EST MOD 30 MIN: CPT | Performed by: STUDENT IN AN ORGANIZED HEALTH CARE EDUCATION/TRAINING PROGRAM

## 2024-10-15 PROCEDURE — 97116 GAIT TRAINING THERAPY: CPT

## 2024-10-15 PROCEDURE — 97530 THERAPEUTIC ACTIVITIES: CPT

## 2024-10-15 PROCEDURE — 97112 NEUROMUSCULAR REEDUCATION: CPT

## 2024-10-15 RX ORDER — CELECOXIB 100 MG
100 CAPSULE ORAL 2 TIMES DAILY
Qty: 180 CAPSULE | Refills: 2 | Status: SHIPPED | OUTPATIENT
Start: 2024-10-15

## 2024-10-15 RX ORDER — CYCLOBENZAPRINE HCL 10 MG
10 TABLET ORAL 3 TIMES DAILY PRN
Qty: 90 TABLET | Refills: 0 | Status: SHIPPED | OUTPATIENT
Start: 2024-10-15

## 2024-10-15 RX ORDER — GABAPENTIN 300 MG/1
300 CAPSULE ORAL 3 TIMES DAILY
Qty: 270 CAPSULE | Refills: 0 | Status: SHIPPED | OUTPATIENT
Start: 2024-10-15 | End: 2025-01-13

## 2024-10-15 RX ORDER — LIDOCAINE 50 MG/G
2 PATCH TOPICAL EVERY 24 HOURS
Qty: 90 PATCH | Refills: 3 | Status: SHIPPED | OUTPATIENT
Start: 2024-10-15

## 2024-10-15 RX ORDER — MIDODRINE HYDROCHLORIDE 2.5 MG/1
2.5 TABLET ORAL
Qty: 270 TABLET | Refills: 2 | Status: SHIPPED | OUTPATIENT
Start: 2024-10-15

## 2024-10-15 RX ORDER — CYCLOBENZAPRINE HCL 10 MG
10 TABLET ORAL 3 TIMES DAILY PRN
COMMUNITY
End: 2024-10-15 | Stop reason: SDUPTHER

## 2024-10-15 NOTE — PROGRESS NOTES
Physical Therapy Daily Treatment Note                      [place] [facility] [address] [facility] [place] [address]    Patient: Ehsan Woodson   : 2005  Diagnosis/ICD-10 Code:  T5 spinal cord injury, subsequent encounter [S24.102D]  Referring practitioner: No ref. provider found  Date of Initial Visit: Type: THERAPY  Noted: 2024  Today's Date: 10/15/2024  Patient seen for 51 sessions           Subjective   The patient reported no new complaints today.  said she can started driving again if she does not have spasms in the R leg.    Objective   See Exercise, Manual, and Modality Logs for complete treatment.     Assessment/Plan  Pt tolerated today's session well. Pt doing better with pulling the sled while not lifting up the front of the sled; she is using more controlled movements to actually slide the sled along the floor. This shows increased core control and glute/hamstring strength. Pt is getting progressively better at walking in the harness without an AD. L AFO significantly improves L LE function during gait. Pt was even observed catching her balance and recovering on her own while amb indep in the harness today. This shows improvements in anticipatory and reactive balance. Pt's doctor told her she could start driving again if she does not have spasms in the R LE. Therapist simulated gas/brake pedal pushing and switching by having pt press down into bosu ball. Pt did exhibit clonus when returning to DF after pressing into the ball with PF. Skilled therapy is still required in order to address impairments and increase level of independence. Continue POC.            Timed:  Manual Therapy:    0     mins  40874;  Therapeutic Exercise:    0     mins  90999;     Neuromuscular Rachana:   20    mins  08860;    Therapeutic Activity:     28     mins  46625;     Gait Trainin    mins  59872;     Aquatics                         0      mins  57924    Un-timed:  Mechanical Traction      0     mins   81881  Dry Needling     0     mins self-pay  Electrical Stimulation:    0     mins  74821 ( );      Timed Treatment:   56   mins   Total Treatment:     56   mins    Mana Awad PT    Electronically signed 10/15/2024    [place] License: PT - [id number]
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
## 2024-10-18 ENCOUNTER — TREATMENT (OUTPATIENT)
Dept: PHYSICAL THERAPY | Facility: CLINIC | Age: 19
End: 2024-10-18
Payer: OTHER GOVERNMENT

## 2024-10-18 DIAGNOSIS — R26.2 DIFFICULTY WALKING: ICD-10-CM

## 2024-10-18 DIAGNOSIS — S24.102D T5 SPINAL CORD INJURY, SUBSEQUENT ENCOUNTER: Primary | ICD-10-CM

## 2024-10-18 DIAGNOSIS — S06.9X2D TRAUMATIC BRAIN INJURY WITH LOSS OF CONSCIOUSNESS OF 31 MINUTES TO 59 MINUTES, SUBSEQUENT ENCOUNTER: ICD-10-CM

## 2024-10-18 NOTE — PROGRESS NOTES
Physical Therapy Daily Treatment Note                      [facility] [address] [place] [address]    Patient: Ehsan Woodson   : 2005  Diagnosis/ICD-10 Code:  T5 spinal cord injury, subsequent encounter [S24.102D]  Referring practitioner: Gary Cash MD  Date of Initial Visit: Type: THERAPY  Noted: 2024  Today's Date: 10/18/2024  Patient seen for 52 sessions           Subjective   The patient reported no new complaints today.     Objective   See Exercise, Manual, and Modality Logs for complete treatment.     Assessment/Plan  Pt tolerated today's session well. Pt was able to ambulate in the harness and perform weaving in and out of cones today. She did well with this, but required intermittent CGA/Sheron for balance recovery. Pt does continue to improve catching herself when she does lose her balance. Therapist also implemented backwards ambulation today. Pt had moderate difficulty with his, but improved with multiple trials. Pt responded well to moderate verbal cueing to straighten her L knee when stepping back with the R LE. Therapist observed some carryover of this. Stool pulls and pushes were implemented to work on R DF strength in preparation for driving. Pt had moderate difficulty with this.  Skilled therapy is still required in order to address impairments and increase level of independence. Continue POC.        Timed:  Manual Therapy:    0     mins  95060;  Therapeutic Exercise:    4    mins  02994;     Neuromuscular Rachana:   10    mins  30536;    Therapeutic Activity:     10     mins  12391;     Gait Trainin    mins  55160;     Aquatics                         0      mins  38436    Un-timed:  Mechanical Traction      0     mins  55113  Dry Needling     0     mins self-pay  Electrical Stimulation:    0     mins  91943 ( );      Timed Treatment:   57   mins   Total Treatment:     57   mins    Mana Awad PT    Electronically signed 10/18/2024    [place] License:  PT - [id number]

## 2024-10-22 ENCOUNTER — TELEPHONE (OUTPATIENT)
Dept: PHYSICAL THERAPY | Facility: CLINIC | Age: 19
End: 2024-10-22

## 2024-10-22 NOTE — TELEPHONE ENCOUNTER
Caller: Ehsan Woodson    Relationship: Self         What was the call regarding: OUT OF TOWN

## 2024-10-25 ENCOUNTER — TREATMENT (OUTPATIENT)
Dept: PHYSICAL THERAPY | Facility: CLINIC | Age: 19
End: 2024-10-25
Payer: OTHER GOVERNMENT

## 2024-10-25 DIAGNOSIS — S06.9X2D TRAUMATIC BRAIN INJURY WITH LOSS OF CONSCIOUSNESS OF 31 MINUTES TO 59 MINUTES, SUBSEQUENT ENCOUNTER: ICD-10-CM

## 2024-10-25 DIAGNOSIS — R26.2 DIFFICULTY WALKING: ICD-10-CM

## 2024-10-25 DIAGNOSIS — S24.102D T5 SPINAL CORD INJURY, SUBSEQUENT ENCOUNTER: Primary | ICD-10-CM

## 2024-10-25 NOTE — PROGRESS NOTES
Progress Note  [facility] [address] [place] [address]      Patient: Ehsan Woodson   : 2005  Diagnosis/ICD-10 Code:  T5 spinal cord injury, subsequent encounter [S24.102D]  Referring practitioner: Gary Cash MD  Date of Initial Visit: Type: THERAPY  Noted: 2024  Today's Date: 10/25/2024  Patient seen for 53 sessions      Subjective:   Subjective Questionnaire:    OPTIMAL: 3 activities you would like to be able to do w/o any difficulty (10,11,16).    Primary goal: #11  DIFFICULTY: 13 score  CONFIDENCE: 9 score      All Questions:   DIFFICULTY: 51  CONFIDENCE: 45    Clinical Progress: improved  Home Program Compliance: Yes  Treatment has included: therapeutic exercise, neuromuscular re-education, manual therapy, therapeutic activity, gait training, and electrical stimulation    Subjective     Pt returned this past Tuesday from a trip to [place]. Pt plans on going to the gym on the days she is not in therapy.     Objective     *Pt was about 12 minutes late to session today.     Left Hip   Planes of Motion   Flexion: 5  Abduction: 2+  Extension: 2+     Right Hip   Planes of Motion   Flexion: 5  Abduction: 3- (3+ in range)  Extension: 2+      Left Knee   Flexion: 4  Extension: 5     Right Knee   Flexion: 5  Extension: 5     Left Ankle/Foot   Dorsiflexion: 2+ (4- in range)     Right Ankle/Foot   Dorsiflexion: 5      Testing:      Ambulating 560' with walking sticks CGA/ ModA for one LOB    See Exercise, Manual, and Modality Logs for complete treatment.       Assessment/Plan    Pt tolerated today's session well. Today was reassessment day. Pt has made great improvements since her last progress note. Pt was able to ambulate 560' with the walking sticks. Pt was able to do this mostly CGA, but there was one episode when she stumbled and the therapist had to provide ModA for recovery of balance. But pt was able to partially catch herself and correct her balance. She has improved significantly  with her reactive balance. Pt was able to ambulate 140' without an AD and with Sheron from the therapist. This is the farthest she has walked without an AD without being in the harness. Pt continues to show significant improvements in ambulation, balance and LE coordination. Her strength measurements have remained relatively the same since the last progress note, but therapist can tell that the hip extensors and abductors are activating more and with more ease. Pt was able to meet one more of her goals today. Pt's L ankle DF is still weak, however, her L foot drag during gait has improved with the use of her L AFO. Pt continues to exhibit weakness of the L hip flexors, L gluteus medius, and R gluteus cathleen muscles. The weakness in these muscle groups impedes her efficiency with bed mobility, transfers and ambulation. Pt continues to have increased quad tone on the left, which makes advancement of the L LE during swing phase difficult. PT will continue to work on ambulation with walking sticks and without an AD (in the harness) to increase independence. Pt requires continued skilled PT services in order to address deficits limiting independence during ADLs, such as transfers, ambulating, indep standing, etc. Continue POC.          Goals  Plan Goals: 1. Mobility: Walking/Moving Around Functional Limitation                      LTG 1: 20 weeks: The patient will be able to ambulate 100 ft with rolling walker with min A in order to improve function and independence with ADLs.  STATUS: MET  STG 1: 12 weeks: The patient will be able to ambulate 15 ft with rolling walker with min A in order to improve function and independence with ADLs.  STATUS: MET  LTG 1b: 20 weeks: The patient will be able to ambulate 140 ft with walking sticks or Lofstrand crutches min A in order to improve function and independence with ADLs.  STATUS: MET  STG 1b: 20 weeks: The patient will be able to ambulate 50 ft with walking sticks or Lofstrand  crutches min A in order to improve function and independence with ADLs.  STATUS: MET     LTG 1c: 20 weeks: The patient will ambulate 560' ft with walking sticks or Lofstrand crutches CGA, in order to improve function and independence with ADLs.  STATUS: progressing      LTG 1d: 20 weeks: The patient will ambulate 140' ft without an AD but with Sheron from the therapist, in order to improve function and independence with ADLs.  STATUS: MET        2. Mobility: Walking/Moving Around Functional Limitation                      LTG 2: 12 weeks: The patient will be able to stand independently for 4 minutes with rolling walker to improve function during ADLs such as cooking, grooming her hair and other activities.  STATUS: MET  STG 2: 6 weeks: The patient will be able to stand with CGA with rolling walker for 2 minutes to improve function during ADLs such as cooking, grooming her hair and other activities.  STATUS: MET  LTG 2b: 12 weeks: The patient will be able to stand independently for 4 minutes in order to improve function during ADLs such as cooking, grooming her hair and other activities.  STATUS: MET  STG 2b: 6 weeks: The patient will be able to stand indep for 2 minutes to improve function during ADLs such as cooking, grooming her hair and other activities.  STATUS: MET     3. Mobility: Walking/Moving Around Functional Limitation                      LTG 2: 12 weeks: The patient will be able to complete sit to/from stand transfers independently.  STATUS: MET  STG 2: 6 weeks: The patient will be able to complete sit to/from stand transfers with CGA.  STATUS: MET     4. Mobility: Walking/Moving Around Functional Limitation                   LTG 4: 20 weeks: The patient will demonstrate improved function by scoring a 55 on the Optimal (Difficulty).  STATUS: MET  STG 4: 6 weeks: The patient will demonstrate improved function by scoring a 70 on the Optimal (Difficulty).  STATUS: MET     LTG 4b: 20 weeks: The patient will  demonstrate improved function by scoring a 26 or less on the Optimal (Difficulty).  STATUS: progressing  STG 4b: 6 weeks: The patient will demonstrate improved function by scoring a 35 or less on the Optimal (Difficulty).  STATUS: progressing    Progress toward previous goals: Partially Met      Recommendations: Continue as planned  Timeframe: 6 weeks  Prognosis to achieve goals: good    Signature: Mana Awad PT    Electronically signed 10/25/2024    [place] License: PT - [id number]      Timed:  Manual Therapy:    0     mins  68162;  Therapeutic Exercise:    12     mins  49153;     Neuromuscular Rachana:    0    mins  45853;    Therapeutic Activity:     35     mins  11715;     Gait Trainin     mins  51600;     Aquatics                         0      mins  48704    Un-timed:  Mechanical Traction      0     mins  90787  Dry Needling     0     mins self-pay  Electrical Stimulation:    0     mins  94081 ( );    Timed Treatment:   47   mins   Total Treatment:     47   mins

## 2024-10-29 ENCOUNTER — TREATMENT (OUTPATIENT)
Dept: PHYSICAL THERAPY | Facility: CLINIC | Age: 19
End: 2024-10-29
Payer: OTHER GOVERNMENT

## 2024-10-29 DIAGNOSIS — S24.102D T5 SPINAL CORD INJURY, SUBSEQUENT ENCOUNTER: Primary | ICD-10-CM

## 2024-10-29 DIAGNOSIS — S06.9X2D TRAUMATIC BRAIN INJURY WITH LOSS OF CONSCIOUSNESS OF 31 MINUTES TO 59 MINUTES, SUBSEQUENT ENCOUNTER: ICD-10-CM

## 2024-10-29 DIAGNOSIS — R26.2 DIFFICULTY WALKING: ICD-10-CM

## 2024-10-29 NOTE — PROGRESS NOTES
Physical Therapy Daily Treatment Note                      [facility] [address] [place] [address]    Patient: Ehsan Woodson   : 2005  Diagnosis/ICD-10 Code:  T5 spinal cord injury, subsequent encounter [S24.102D]  Referring practitioner: Gary Cash MD  Date of Initial Visit: Type: THERAPY  Noted: 2024  Today's Date: 10/29/2024  Patient seen for 54 sessions           Subjective   The patient reported that she walked in the grass a lot this weekend. She fell a few times but was able to get herself up.     Objective   See Exercise, Manual, and Modality Logs for complete treatment.     Assessment/Plan  Pt tolerated today's session well. Pt's adductors are stronger than her abductors, as evident by performance on cybex hip machine. Pt was able to perform STS from a low surface without the use of her UE's, however, her knees went into valgus during the transition from sit to stand and stand to sit. This got better with multiple trials and with use of theraband around her knees to cue her to push out. Pt is improving with step-ups onto 6-inch step with both LE's. However, she still has more difficulty with lifting the L LE onto and off of the step. There was an instance today where pt was taking a standing rest break while holding onto the counter with both hands, and she brought one leg back into a lunge stretch (she was not instructed to do this) when one of her knees gave out beneath her. This caused her to fall straight down to the floor, but she did not injure herself. Pt was assisted up from the floor by therapist and pt's mother. Pt was not injured and reported that she was fine. Skilled therapy is still required in order to address impairments and increase level of independence. Continue POC.        Timed:  Manual Therapy:    0     mins  62917;  Therapeutic Exercise:    20     mins  09194;     Neuromuscular Rachana:   10    mins  40438;    Therapeutic Activity:     23     mins   19048;     Gait Trainin     mins  33200;     Aquatics                         0      mins  15933    Un-timed:  Mechanical Traction      0     mins  89974  Dry Needling     0     mins self-pay  Electrical Stimulation:    0     mins  88254 ( );      Timed Treatment:   53   mins   Total Treatment:     53   mins    Mana Awad PT    Electronically signed 10/29/2024    [place] License: PT - [id number]

## 2024-11-01 ENCOUNTER — TREATMENT (OUTPATIENT)
Dept: PHYSICAL THERAPY | Facility: CLINIC | Age: 19
End: 2024-11-01
Payer: OTHER GOVERNMENT

## 2024-11-01 DIAGNOSIS — S24.102D T5 SPINAL CORD INJURY, SUBSEQUENT ENCOUNTER: Primary | ICD-10-CM

## 2024-11-01 DIAGNOSIS — S06.9X2D TRAUMATIC BRAIN INJURY WITH LOSS OF CONSCIOUSNESS OF 31 MINUTES TO 59 MINUTES, SUBSEQUENT ENCOUNTER: ICD-10-CM

## 2024-11-01 DIAGNOSIS — R26.2 DIFFICULTY WALKING: ICD-10-CM

## 2024-11-01 NOTE — PROGRESS NOTES
Physical Therapy Daily Treatment Note                      [facility] [address] [facility] [place] [address]    Patient: Ehsan Woodson   : 2005  Diagnosis/ICD-10 Code:  T5 spinal cord injury, subsequent encounter [S24.102D]  Referring practitioner: Gary Cash MD  Date of Initial Visit: Type: THERAPY  Noted: 2024  Today's Date: 2024  Patient seen for 55 sessions           Subjective   The patient reported that she did not go to the gym today because the gym is being remodeled.     Objective   See Exercise, Manual, and Modality Logs for complete treatment.     Assessment/Plan  Pt tolerated today's session well. Pt still having difficulty with hip strength, especially hip abduction. Pt performed better with STS today when a ball was placed in between her knees so that her knees could not come into valgus. Sidelying hip abduction still weak B, indicating ongoing weakness of B gluteus medius muscles. Prone hip extension is improving B, but it is still very difficult for pt to lift her R thigh off the table in prone, indicating ongoing gluteus cathleen weakness. Pt was having severe difficulty with prone L hamstring curl, but this got better when using associated reactions, so therapist told pt to try and curl both legs at the same time. This caused increased AROM during L hamstring curl. By the end of the session, pt was able to perform singular L side prone HS curl, indicating improvements in neural pathways and recruitment. Skilled therapy is still required in order to address impairments and increase level of independence. Continue POC.        Timed:  Manual Therapy:    0     mins  74268;  Therapeutic Exercise:    23     mins  53284;     Neuromuscular Rachana:   15    mins  99505;    Therapeutic Activity:     15     mins  27633;     Gait Trainin     mins  08877;     Aquatics                         0      mins  61499    Un-timed:  Mechanical Traction      0     mins  15709  Dry  Needling     0     mins self-pay  Electrical Stimulation:    0     mins  14178 ( );      Timed Treatment:   53   mins   Total Treatment:     53   mins    Mana Awad PT    Electronically signed 11/1/2024    [place] License: PT - [id number]

## 2024-11-05 ENCOUNTER — TREATMENT (OUTPATIENT)
Dept: PHYSICAL THERAPY | Facility: CLINIC | Age: 19
End: 2024-11-05
Payer: OTHER GOVERNMENT

## 2024-11-05 DIAGNOSIS — S24.102D T5 SPINAL CORD INJURY, SUBSEQUENT ENCOUNTER: Primary | ICD-10-CM

## 2024-11-05 DIAGNOSIS — S06.9X2D TRAUMATIC BRAIN INJURY WITH LOSS OF CONSCIOUSNESS OF 31 MINUTES TO 59 MINUTES, SUBSEQUENT ENCOUNTER: ICD-10-CM

## 2024-11-05 DIAGNOSIS — R26.2 DIFFICULTY WALKING: ICD-10-CM

## 2024-11-05 NOTE — PROGRESS NOTES
Physical Therapy Daily Treatment Note                      [facility] [address] [facility] [place] [address]    Patient: Ehsan Woodson   : 2005  Diagnosis/ICD-10 Code:  T5 spinal cord injury, subsequent encounter [S24.102D]  Referring practitioner: Gary Cash MD  Date of Initial Visit: Type: THERAPY  Noted: 2024  Today's Date: 2024  Patient seen for 56 sessions           Subjective   The patient reported that she has been walking in her home with a cane.     Objective   See Exercise, Manual, and Modality Logs for complete treatment.     Assessment/Plan  Pt tolerated today's session well. Pt still having difficulty with hip strength, especially hip abduction, but this is steadily improving as therapist consistently implements more LE, machine-based exercises. Pt's STS have improved significantly when using the ball and band. Pt is now even able to keep her knees in neutral when performing a sit to stand without equipment. Pt still has difficulty with prone L hamstring curl, but this is already improved compared to last session. Associated reaction of R prone HS curl still helps her perform L prone HS curl. Skilled therapy is still required in order to address impairments and increase level of independence. Continue POC.        Timed:  Manual Therapy:    0     mins  15633;  Therapeutic Exercise:    30     mins  65167;     Neuromuscular Rachana:   8    mins  14067;    Therapeutic Activity:     18     mins  45615;     Gait Trainin     mins  39578;     Aquatics                         0      mins  43561    Un-timed:  Mechanical Traction      0     mins  57381  Dry Needling     0     mins self-pay  Electrical Stimulation:    0     mins  17804 ( );      Timed Treatment:   56   mins   Total Treatment:     56   mins    Mana Awad PT    Electronically signed 2024    [place] License: PT - [id number]

## 2024-11-08 ENCOUNTER — TREATMENT (OUTPATIENT)
Dept: PHYSICAL THERAPY | Facility: CLINIC | Age: 19
End: 2024-11-08
Payer: OTHER GOVERNMENT

## 2024-11-08 DIAGNOSIS — R26.2 DIFFICULTY WALKING: ICD-10-CM

## 2024-11-08 DIAGNOSIS — S24.102D T5 SPINAL CORD INJURY, SUBSEQUENT ENCOUNTER: Primary | ICD-10-CM

## 2024-11-08 DIAGNOSIS — S06.9X2D TRAUMATIC BRAIN INJURY WITH LOSS OF CONSCIOUSNESS OF 31 MINUTES TO 59 MINUTES, SUBSEQUENT ENCOUNTER: ICD-10-CM

## 2024-11-08 NOTE — PROGRESS NOTES
Physical Therapy Daily Treatment Note                      [facility] [address] [place] [address]    Patient: Ehsan Woodson   : 2005  Diagnosis/ICD-10 Code:  T5 spinal cord injury, subsequent encounter [S24.102D]  Referring practitioner: Gary Cash MD  Date of Initial Visit: Type: THERAPY  Noted: 2024  Today's Date: 2024  Patient seen for 57 sessions           Subjective   The patient reported no new complaints today.     Objective   See Exercise, Manual, and Modality Logs for complete treatment.     Assessment/Plan  Pt tolerated today's session well. Pt still having difficulty with hip strength, especially hip abduction. This is gradually improving, but pt requires continued practice with the cybex hip machine. Pt was able to increase resistance from 35# to 40# during L single-leg leg press today, indicating improved L LE strength. Pt was having difficulty with preventing B knee valgus (L worse than R) during STS today, but this might be because therapist was using a higher-level resistance band. This improved greatly when therapist provided mirror-feedback so that pt could see her valgus and correct it. Pt was able to improve postural corrections during static standing in front of the mirror today, as well. Pt realized that she tends to put more weight through her R LE, causing pelvic rotation, which she was able to correct and maintain. Pt continues to have difficulty with hamstring curls, especially on the L, but this is improving, and pt was even able to try the actual hamstring machine today. Skilled therapy is still required in order to address impairments and increase level of independence. Continue POC.        Timed:  Manual Therapy:    0     mins  84357;  Therapeutic Exercise:    15     mins  80443;     Neuromuscular Rachana:   10    mins  99360;    Therapeutic Activity:     33     mins  26441;     Gait Trainin     mins  41176;     Aquatics                          0      mins  26207    Un-timed:  Mechanical Traction      0     mins  72926  Dry Needling     0     mins self-pay  Electrical Stimulation:    0     mins  85545 ( );      Timed Treatment:   58   mins   Total Treatment:     58   mins    Mana Awad PT    Electronically signed 11/8/2024    [place] License: PT - [id number]

## 2024-11-12 ENCOUNTER — TREATMENT (OUTPATIENT)
Dept: PHYSICAL THERAPY | Facility: CLINIC | Age: 19
End: 2024-11-12
Payer: OTHER GOVERNMENT

## 2024-11-12 DIAGNOSIS — S06.9X2D TRAUMATIC BRAIN INJURY WITH LOSS OF CONSCIOUSNESS OF 31 MINUTES TO 59 MINUTES, SUBSEQUENT ENCOUNTER: ICD-10-CM

## 2024-11-12 DIAGNOSIS — R26.2 DIFFICULTY WALKING: ICD-10-CM

## 2024-11-12 DIAGNOSIS — S24.102D T5 SPINAL CORD INJURY, SUBSEQUENT ENCOUNTER: Primary | ICD-10-CM

## 2024-11-12 NOTE — PROGRESS NOTES
Physical Therapy Daily Treatment Note                      [facility] [address] [facility] [place] [address]    Patient: Ehsan Woodson   : 2005  Diagnosis/ICD-10 Code:  T5 spinal cord injury, subsequent encounter [S24.102D]  Referring practitioner: Gary Cash MD  Date of Initial Visit: Type: THERAPY  Noted: 2024  Today's Date: 2024  Patient seen for 58 sessions           Subjective   The patient reported no new complaints today. She has not yet tried to go to the gym.     Objective   See Exercise, Manual, and Modality Logs for complete treatment.     Assessment/Plan  Pt tolerated today's session well. Pt is improving with preventing B knee valgus during STS, even without mirror feedback. Pt continues to require CGA/min A for balance during step-ups. Although, she is doing them without UE support. Pt continues to have difficulty with prone hamstring curls, especially on the L, but this is improving. Pt did well with seated hamstring curls with a slider today. This did require a lot of effort from her, and she was significantly leaning her trunk forward & balling up her fists as associated reactions.  Skilled therapy is still required in order to address impairments and increase level of independence. Continue POC.        Timed:  Manual Therapy:    0     mins  79033;  Therapeutic Exercise:    30     mins  51985;     Neuromuscular Rachana:   0    mins  22858;    Therapeutic Activity:     24     mins  45263;     Gait Trainin     mins  11111;     Aquatics                         0      mins  96616    Un-timed:  Mechanical Traction      0     mins  57143  Dry Needling     0     mins self-pay  Electrical Stimulation:    0     mins  88866 ( );      Timed Treatment:   54   mins   Total Treatment:     54   mins    Mana Awad PT    Electronically signed 2024    [place] License: PT - [id number]

## 2024-11-15 ENCOUNTER — TELEPHONE (OUTPATIENT)
Dept: PHYSICAL THERAPY | Facility: OTHER | Age: 19
End: 2024-11-15
Payer: OTHER GOVERNMENT

## 2024-11-15 NOTE — TELEPHONE ENCOUNTER
Caller: Ehsan Woodson    Relationship: Self    What was the call regarding: PATIENT CANCELLED TODAYS APPT DUE TO BEING OUT OF TOWN

## 2024-11-19 ENCOUNTER — TREATMENT (OUTPATIENT)
Dept: PHYSICAL THERAPY | Facility: CLINIC | Age: 19
End: 2024-11-19
Payer: OTHER GOVERNMENT

## 2024-11-19 DIAGNOSIS — S06.9X2D TRAUMATIC BRAIN INJURY WITH LOSS OF CONSCIOUSNESS OF 31 MINUTES TO 59 MINUTES, SUBSEQUENT ENCOUNTER: ICD-10-CM

## 2024-11-19 DIAGNOSIS — S24.102D T5 SPINAL CORD INJURY, SUBSEQUENT ENCOUNTER: Primary | ICD-10-CM

## 2024-11-19 DIAGNOSIS — R26.2 DIFFICULTY WALKING: ICD-10-CM

## 2024-11-19 NOTE — PROGRESS NOTES
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 She walked around a lot with her walker. She was pretty exhausted after this.     Objective   See Exercise, Manual, and Modality Logs for complete treatment.     Assessment/Plan  Pt tolerated today's session well. Pt is improving with STS, as evidenced by decreasing valgus of B knees. Pt's L LE did exhibit some clonus during pedaling on the bike, but this improved with time. Pt ambulated in the harness without an AD today for the first time in about one month. Her ambulation is much better, but there were several instances where the harness had to catch her. She mostly stumbled during turns or because she was trying to ambulate too fast. Pt is improving with using her step-strategy fort recovery of balance. Pt still has severe difficulty with lifting L hip to perform cone taps, but this has improved. Skilled therapy is still required in order to address impairments and increase level of independence. Continue POC.        Timed:  Manual Therapy:    0     mins  90242;  Therapeutic Exercise:    25     mins  86870;     Neuromuscular Rachana:   0    mins  67727;    Therapeutic Activity:     15     mins  22731;     Gait Training:      15     mins  19473;     Aquatics                         0      mins  59178    Un-timed:  Mechanical Traction      0     mins  33438  Dry Needling     0     mins self-pay  Electrical Stimulation:    0     mins  42246 ( );      Timed Treatment:   55   mins   Total Treatment:     55    albino Awad PT    Electronically signed 11/19/2024    [place] License: PT - [id number]

## 2024-11-22 ENCOUNTER — TREATMENT (OUTPATIENT)
Dept: PHYSICAL THERAPY | Facility: CLINIC | Age: 19
End: 2024-11-22
Payer: OTHER GOVERNMENT

## 2024-11-22 DIAGNOSIS — S24.102D T5 SPINAL CORD INJURY, SUBSEQUENT ENCOUNTER: Primary | ICD-10-CM

## 2024-11-22 DIAGNOSIS — R26.2 DIFFICULTY WALKING: ICD-10-CM

## 2024-11-22 DIAGNOSIS — S06.9X2D TRAUMATIC BRAIN INJURY WITH LOSS OF CONSCIOUSNESS OF 31 MINUTES TO 59 MINUTES, SUBSEQUENT ENCOUNTER: ICD-10-CM

## 2024-11-22 NOTE — PROGRESS NOTES
Physical Therapy Daily Treatment Note                      [facility] [address] [facility] [place] [address]    Patient: Ehsan Woodson   : 2005  Diagnosis/ICD-10 Code:  T5 spinal cord injury, subsequent encounter [S24.102D]  Referring practitioner: Gary Cash MD  Date of Initial Visit: Type: THERAPY  Noted: 2024  Today's Date: 2024  Patient seen for 60 sessions           Subjective   The patient reported no new complaints today. Pt reports that her mom is going to call [facility] physical therapy in [place] today to set up an evaluation for the pt in preparation when they move.    Objective   See Exercise, Manual, and Modality Logs for complete treatment.     Assessment/Plan  Pt tolerated today's session well. Pt required min verbal cueing to slow down her speed when performing repetitions for hip adduction today. Pt was able to increase resistance for L SL leg press by 5#. Momentum carried pt too far with first STS and therapist had to provide mod A for recovery of balance. Pt did the other STS repetitions with good control and form. Pt was able to perform 20 repetitions of STS without resting for the first time today, indicating improvements in LE endurance. Therapist implemented TKE for L LE today since pt has been ambulating without exhibiting full extension of the L LE during stance phase of gait. Pt requires extensive verbal cueing to straighten her LE's while in stance. Therapist took a video on pt's phone to show her how her L knee stays bent when ambulating. After seeing this video, her gait was improved. Therapist gave pt updated HEP today that included gym exercises. Skilled therapy is still required in order to address impairments and increase level of independence. Continue POC. Pt will discharge from therapy at next visit because she is moving to a different state.        Timed:  Manual Therapy:    0     mins  72001;  Therapeutic Exercise:    29     mins  07436;      Neuromuscular Rachana:  0   mins  01316;    Therapeutic Activity:     8     mins  38399;     Gait Trainin     mins  11252;     Aquatics                         0      mins  68906    Un-timed:  Mechanical Traction      0     mins  01015  Dry Needling     0     mins self-pay  Electrical Stimulation:    0     mins  55865 ( );      Timed Treatment:   55   mins   Total Treatment:     55   mins    Mana Awad PT    Electronically signed 2024    [place] License: PT - [id number]

## 2024-11-26 ENCOUNTER — TREATMENT (OUTPATIENT)
Dept: PHYSICAL THERAPY | Facility: CLINIC | Age: 19
End: 2024-11-26
Payer: OTHER GOVERNMENT

## 2024-11-26 DIAGNOSIS — S24.102D T5 SPINAL CORD INJURY, SUBSEQUENT ENCOUNTER: Primary | ICD-10-CM

## 2024-11-26 DIAGNOSIS — S06.9X2D TRAUMATIC BRAIN INJURY WITH LOSS OF CONSCIOUSNESS OF 31 MINUTES TO 59 MINUTES, SUBSEQUENT ENCOUNTER: ICD-10-CM

## 2024-11-26 DIAGNOSIS — R26.2 DIFFICULTY WALKING: ICD-10-CM

## 2024-11-26 NOTE — PROGRESS NOTES
Re-Assessment / Discharge   [facility] [address] [place] [address]      Patient: Ehsan Woodson   : 2005  Diagnosis/ICD-10 Code:  T5 spinal cord injury, subsequent encounter [S24.102D]  Referring practitioner: Gary Cash MD  Date of Initial Visit: Type: THERAPY  Noted: 2024  Today's Date: 2024  Patient seen for 61 sessions      Subjective:     Clinical Progress: improved  Home Program Compliance: Yes  Treatment has included: therapeutic exercise, neuromuscular re-education, manual therapy, therapeutic activity, gait training, and electrical stimulation    Subjective     Pt has decided to go to [facility] when she moves to [place].    Today will be pt's last therapy treatment session here because she is moving.     Objective   Ambulating 560' with walking sticks with Sheron from therapy intermittently for balance recovery.     5xSTS: 15.39 seconds (normal height chair, no use of UE's)    See Exercise, Manual, and Modality Logs for complete treatment.     Assessment/Plan  Pt tolerated today's session well. Today was reassessment day. Pt has made great improvements since starting therapy and even since her last progress note. Pt has met the majority of her goals, but still has much improvement that can be made. Pt will be discharged now, as she is moving to another state. She was given an updated HEP, which includes machine-based exercises. Pt will continue to benefit from physical therapy in the state that she is moving to. Pt will be discharged from our clinic at this time.            Goals  Plan Goals: 1. Mobility: Walking/Moving Around Functional Limitation                      LTG 1: 20 weeks: The patient will be able to ambulate 100 ft with rolling walker with min A in order to improve function and independence with ADLs.  STATUS: MET  STG 1: 12 weeks: The patient will be able to ambulate 15 ft with rolling walker with min A in order to improve function and independence  with ADLs.  STATUS: MET  LTG 1b: 20 weeks: The patient will be able to ambulate 140 ft with walking sticks or Lofstrand crutches min A in order to improve function and independence with ADLs.  STATUS: MET  STG 1b: 20 weeks: The patient will be able to ambulate 50 ft with walking sticks or Lofstrand crutches min A in order to improve function and independence with ADLs.  STATUS: MET     LTG 1c: 20 weeks: The patient will ambulate 560' ft with walking sticks or Lofstrand crutches CGA, in order to improve function and independence with ADLs.  STATUS: NOT MET     LTG 1d: 20 weeks: The patient will ambulate 140' ft without an AD but with Sheron from the therapist, in order to improve function and independence with ADLs.  STATUS: MET        2. Mobility: Walking/Moving Around Functional Limitation                      LTG 2: 12 weeks: The patient will be able to stand independently for 4 minutes with rolling walker to improve function during ADLs such as cooking, grooming her hair and other activities.  STATUS: MET  STG 2: 6 weeks: The patient will be able to stand with CGA with rolling walker for 2 minutes to improve function during ADLs such as cooking, grooming her hair and other activities.  STATUS: MET  LTG 2b: 12 weeks: The patient will be able to stand independently for 4 minutes in order to improve function during ADLs such as cooking, grooming her hair and other activities.  STATUS: MET  STG 2b: 6 weeks: The patient will be able to stand indep for 2 minutes to improve function during ADLs such as cooking, grooming her hair and other activities.  STATUS: MET     3. Mobility: Walking/Moving Around Functional Limitation     scooter heel pull                 LTG 2: 12 weeks: The patient will be able to complete sit to/from stand transfers independently.  STATUS: MET  STG 2: 6 weeks: The patient will be able to complete sit to/from stand transfers with CGA.  STATUS: MET     4. Mobility: Walking/Moving Around Functional  Limitation                   LTG 4: 20 weeks: The patient will demonstrate improved function by scoring a 55 on the Optimal (Difficulty).  STATUS: MET  STG 4: 6 weeks: The patient will demonstrate improved function by scoring a 70 on the Optimal (Difficulty).  STATUS: MET     LTG 4b: 20 weeks: The patient will demonstrate improved function by scoring a 26 or less on the Optimal (Difficulty).  STATUS: NOT MET  STG 4b: 6 weeks: The patient will demonstrate improved function by scoring a 35 or less on the Optimal (Difficulty).  STATUS:  NOT MET    Progress toward previous goals: Partially Met      Recommendations: Discharge      Signature: Mana Awad PT    Electronically signed 2024    [place] License: PT - [id number]      Based upon review of the patient's progress and continued therapy plan, it is my medical opinion that Ehsan Woodson should continue physical therapy treatment at [facility]  [address] [place] [address] [phone].    Signature: __________________________________  Gary Cash MD    90 Day Recertification  Certification Period: 2024 thru 2025  I certify that the therapy services are furnished while this patient is under my care.  The services outlined above are required by this patient, and will be reviewed every 90 days.     PHYSICIAN: Gary Cash MD  NPI: [id number]      DATE:     Please sign and return via fax to [phone]. Thank you, [facility].    Timed:  Manual Therapy:    0     mins  75424;  Therapeutic Exercise:    10    mins  75672;     Neuromuscular Rachana:    0    mins  08351;    Therapeutic Activity:     30     mins  99504;     Gait Trainin     mins  68199;     Aquatics                         0      mins  93585    Un-timed:  Mechanical Traction      0     mins  97812  Dry Needling     0     mins self-pay  Electrical Stimulation:    0     mins  06680 ( );    Timed Treatment:    54   mins   Total Treatment:     54   mins

## 2025-03-04 ENCOUNTER — TELEPHONE (OUTPATIENT)
Dept: FAMILY MEDICINE CLINIC | Facility: CLINIC | Age: 20
End: 2025-03-04

## 2025-03-04 NOTE — TELEPHONE ENCOUNTER
Caller: Ehsan Woodson    Relationship: Self    Best call back number: [phone]    What form or medical record are you requesting: INSURANCE FORM    Who is requesting this form or medical record from you: PATIENT    How would you like to receive the form or medical records (pick-up, mail, fax): [employer]  If fax, what is the fax number: N/A  If mail, what is the address: N/A  If pick-up, provide patient with address and location details    Timeframe paperwork needed: ASAP    Additional notes: PATIENT SENT FORMS BY [employer] THAT SHE NEEDS FILLED OUT ASAP AND RETURNED TO HER MYCHART. PLEASE FILL THESE OUT AND RETURN TO PATIENT. SHE HAS MOVED OUT OF STATE AND AN APPOINTMENT WOULD BE IMPOSSIBLE.